# Patient Record
Sex: FEMALE | NOT HISPANIC OR LATINO | URBAN - METROPOLITAN AREA
[De-identification: names, ages, dates, MRNs, and addresses within clinical notes are randomized per-mention and may not be internally consistent; named-entity substitution may affect disease eponyms.]

---

## 2017-01-27 ENCOUNTER — OFFICE VISIT (OUTPATIENT)
Dept: FAMILY MEDICINE | Facility: CLINIC | Age: 47
End: 2017-01-27
Payer: COMMERCIAL

## 2017-01-27 VITALS
TEMPERATURE: 97.8 F | DIASTOLIC BLOOD PRESSURE: 78 MMHG | HEIGHT: 64 IN | WEIGHT: 130 LBS | SYSTOLIC BLOOD PRESSURE: 112 MMHG | BODY MASS INDEX: 22.2 KG/M2 | HEART RATE: 68 BPM

## 2017-01-27 DIAGNOSIS — E03.2 HYPOTHYROIDISM DUE TO MEDICATION: ICD-10-CM

## 2017-01-27 DIAGNOSIS — Z13.1 SCREENING FOR DIABETES MELLITUS: ICD-10-CM

## 2017-01-27 DIAGNOSIS — Z00.00 ROUTINE GENERAL MEDICAL EXAMINATION AT A HEALTH CARE FACILITY: Primary | ICD-10-CM

## 2017-01-27 DIAGNOSIS — Z11.3 SCREEN FOR STD (SEXUALLY TRANSMITTED DISEASE): ICD-10-CM

## 2017-01-27 DIAGNOSIS — Z13.6 SCREENING FOR CARDIOVASCULAR CONDITION: ICD-10-CM

## 2017-01-27 DIAGNOSIS — R42 LIGHTHEADEDNESS: ICD-10-CM

## 2017-01-27 DIAGNOSIS — E05.00 TOXIC DIFFUSE GOITER: ICD-10-CM

## 2017-01-27 DIAGNOSIS — Z12.4 SCREENING FOR MALIGNANT NEOPLASM OF CERVIX: ICD-10-CM

## 2017-01-27 DIAGNOSIS — L91.8 SKIN TAG: ICD-10-CM

## 2017-01-27 LAB
ERYTHROCYTE [DISTWIDTH] IN BLOOD BY AUTOMATED COUNT: 12.9 % (ref 10–15)
HCT VFR BLD AUTO: 35.3 % (ref 35–47)
HGB BLD-MCNC: 11.5 G/DL (ref 11.7–15.7)
MCH RBC QN AUTO: 28.5 PG (ref 26.5–33)
MCHC RBC AUTO-ENTMCNC: 32.6 G/DL (ref 31.5–36.5)
MCV RBC AUTO: 88 FL (ref 78–100)
PLATELET # BLD AUTO: 339 10E9/L (ref 150–450)
RBC # BLD AUTO: 4.03 10E12/L (ref 3.8–5.2)
WBC # BLD AUTO: 6.6 10E9/L (ref 4–11)

## 2017-01-27 PROCEDURE — 36415 COLL VENOUS BLD VENIPUNCTURE: CPT | Performed by: PHYSICIAN ASSISTANT

## 2017-01-27 PROCEDURE — 87624 HPV HI-RISK TYP POOLED RSLT: CPT | Performed by: PHYSICIAN ASSISTANT

## 2017-01-27 PROCEDURE — 87591 N.GONORRHOEAE DNA AMP PROB: CPT | Performed by: PHYSICIAN ASSISTANT

## 2017-01-27 PROCEDURE — 84443 ASSAY THYROID STIM HORMONE: CPT | Performed by: PHYSICIAN ASSISTANT

## 2017-01-27 PROCEDURE — 85027 COMPLETE CBC AUTOMATED: CPT | Performed by: PHYSICIAN ASSISTANT

## 2017-01-27 PROCEDURE — 82947 ASSAY GLUCOSE BLOOD QUANT: CPT | Performed by: PHYSICIAN ASSISTANT

## 2017-01-27 PROCEDURE — 80061 LIPID PANEL: CPT | Performed by: PHYSICIAN ASSISTANT

## 2017-01-27 PROCEDURE — 99396 PREV VISIT EST AGE 40-64: CPT | Performed by: PHYSICIAN ASSISTANT

## 2017-01-27 PROCEDURE — G0145 SCR C/V CYTO,THINLAYER,RESCR: HCPCS | Performed by: PHYSICIAN ASSISTANT

## 2017-01-27 PROCEDURE — 87491 CHLMYD TRACH DNA AMP PROBE: CPT | Performed by: PHYSICIAN ASSISTANT

## 2017-01-27 NOTE — PROGRESS NOTES
SUBJECTIVE:     CC: Valentin Butts is an 46 year old woman who presents for preventive health visit.     Healthy Habits:    Do you get at least three servings of calcium containing foods daily (dairy, green leafy vegetables, etc.)? no, taking calcium and/or vitamin D supplement: yes - vitamin D     Amount of exercise or daily activities, outside of work: 7 day(s) per week    Problems taking medications regularly No    Medication side effects: No    Have you had an eye exam in the past two years? yes    Do you see a dentist twice per year? no    Do you have sleep apnea, excessive snoring or daytime drowsiness? no      - C/o spell of lightheadedness, neck pain, nausea which resolved after 1-2 hours. Took antacid, which helped with nausea. May have been dehydrated. No aggravating factors.  - Has extra skin in groin area, would like examined.    Today's PHQ-2 Score:   PHQ-2 ( 1999 Pfizer) 7/1/2016 1/26/2016   Q1: Little interest or pleasure in doing things 0 0   Q2: Feeling down, depressed or hopeless 1 1   PHQ-2 Score 1 1       Abuse: Current or Past(Physical, Sexual or Emotional)- No  Do you feel safe in your environment - Yes    Social History   Substance Use Topics     Smoking status: Never Smoker      Smokeless tobacco: Never Used     Alcohol Use: Yes      Comment: occasional at functions     The patient does not drink >3 drinks per day nor >7 drinks per week.    Recent Labs   Lab Test  01/26/16   0946  10/02/12   0912   CHOL  211*  210*   HDL  61  56   LDL  135*  135*   TRIG  74  96   CHOLHDLRATIO   --   3.8   NHDL  150*   --        Reviewed orders with patient.  Reviewed health maintenance and updated orders accordingly - Yes    Mammo Decision Support:  Patient over age 50, mutual decision to screen reflected in health maintenance.    Pertinent mammograms are reviewed under the imaging tab.  History of abnormal Pap smear:   NO - age 30-65 PAP every 5 years with negative HPV co-testing recommended  Last 3 Pap  "Results:   PAP (no units)   Date Value   10/02/2012 NIL     All Histories reviewed and updated in Epic.      ROS:  C: NEGATIVE for fever, chills, change in weight  I: NEGATIVE for worrisome rashes, moles or lesions  E: NEGATIVE for vision changes or irritation  ENT: NEGATIVE for ear, mouth and throat problems  R: NEGATIVE for significant cough or SOB  B: NEGATIVE for masses, tenderness or discharge  CV: NEGATIVE for chest pain, palpitations or peripheral edema  GI: as above  : NEGATIVE for unusual urinary or vaginal symptoms. Periods are regular.  M: NEGATIVE for significant arthralgias or myalgia  N: as above  P: NEGATIVE for changes in mood or affect    Problem list, Medication list, Allergies, and Medical/Social/Surgical histories reviewed in [facility] and updated as appropriate.  OBJECTIVE:     /78 mmHg  Pulse 68  Temp(Src) 97.8  F (36.6  C) (Tympanic)  Ht 5' 3.5\" (1.613 m)  Wt 130 lb (58.968 kg)  BMI 22.66 kg/m2  Breastfeeding? No  EXAM:  GENERAL: healthy, alert and no distress  EYES: mild exophthalmos (R > L), PERRL and conjunctivae and sclerae normal  HENT: ear canals and TM's normal, nose and mouth without ulcers or lesions  NECK: no adenopathy, no asymmetry, masses, or scars and thyroid normal to palpation  RESP: lungs clear to auscultation - no rales, rhonchi or wheezes  BREAST: normal without masses, tenderness or nipple discharge and no palpable axillary masses or adenopathy  CV: regular rate and rhythm, normal S1 S2, no S3 or S4, no murmur, click or rub, no peripheral edema and peripheral pulses strong  ABDOMEN: soft, nontender, no hepatosplenomegaly, no masses and bowel sounds normal   (female): normal female external genitalia, normal urethral meatus, vaginal mucosa pink, moist, well rugated, and normal cervix/adnexa/uterus without masses or discharge. Mod-large sized skin tag on left mid-labia majora.  MS: no gross musculoskeletal defects noted, no edema  SKIN: no suspicious lesions or " "rashes  NEURO: Normal strength and tone, mentation intact and speech normal  PSYCH: mentation appears normal, affect normal/bright    ASSESSMENT/PLAN:         ICD-10-CM    1. Routine general medical examination at a health care facility Z00.00    2. Lightheadedness R42 CBC with platelets   3. 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 Skin tag L91.8 OB/GYN REFERRAL   5. 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 Screening for diabetes mellitus Z13.1 Glucose   7. Screening for cardiovascular condition Z13.6 Lipid panel reflex to direct LDL   8. Screening for malignant neoplasm of cervix Z12.4 Pap imaged thin layer screen with HPV - recommended age 30 - 65 years (select HPV order below)     HPV High Risk Types DNA Cervical   9. Screen for STD (sexually transmitted disease) Z11.3 Chlamydia trachomatis PCR     Neisseria gonorrhoeae PCR     - Lightheadedness likely related to dehydration. Will check TSH, CBC to r/o underlying metabolic cause.  - Given large size and wide base, referred to gyn for labial skin tag removal.    COUNSELING:   Reviewed preventive health counseling, as reflected in patient instructions     reports that she has never smoked. She has never used smokeless tobacco.    Estimated body mass index is 22.66 kg/(m^2) as calculated from the following:    Height as of this encounter: 5' 3.5\" (1.613 m).    Weight as of this encounter: 130 lb (58.968 kg).       Counseling Resources:  ATP IV Guidelines  Pooled Cohorts Equation Calculator  Breast Cancer Risk Calculator  FRAX Risk Assessment  ICSI Preventive Guidelines  Dietary Guidelines for Americans, 2010  USDA's MyPlate  ASA Prophylaxis  Lung CA Screening    Federica Lacy PA-C  [facility]  "

## 2017-01-27 NOTE — Clinical Note
[place] [address] [facility]  [address]  [facility] [address] [phone]      February 2, 2017    Valentin Butts  [address]  [place] [address]    Dear Valentin,  We are happy to inform you that your PAP smear result from 01/27/17 is normal.  We are now able to do a follow up test on PAP smears. The DNA test is for HPV (Human Papilloma Virus). Cervical cancer is closely linked with certain types of HPV. Your result showed no evidence of high risk HPV.  Therefore we recommend you return in 5 years for your next pap smear and HPV test.  You will still need to return to the clinic every year for an annual exam and other preventive tests.  Please contact the clinic with any questions.  Sincerely,  Federica Lacy PA-C/yudy

## 2017-01-27 NOTE — MR AVS SNAPSHOT
After Visit Summary   1/27/2017    Valentin Butts    MRN: [id number]           Patient Information     Date Of Birth          1970        Visit Information        Provider Department      1/27/2017 8:10 AM Federica Lacy PA-C [facility]        Today's Diagnoses     Routine general medical examination at a health care facility    -  1     Lightheadedness         Hypothyroidism due to medication         Skin tag         Toxic diffuse goiter         Screening for diabetes mellitus         Screening for cardiovascular condition         Screening for malignant neoplasm of cervix         Screen for STD (sexually transmitted disease)           Care Instructions      Preventive Health Recommendations  Female Ages 40 to 49    Yearly exam:     See your health care provider every year in order to  1. Review health changes.   2. Discuss preventive care.    3. Review your medicines if your doctor prescribed any.      Get a Pap test every three years (unless you have an abnormal result and your provider advises testing more often).      If you get Pap tests with HPV test, you only need to test every 5 years, unless you have an abnormal result. You do not need a Pap test if your uterus was removed (hysterectomy) and you have not had cancer.      You should be tested each year for STDs (sexually transmitted diseases), if you're at risk.       Ask your doctor if you should have a mammogram.      Have a colonoscopy (test for colon cancer) if someone in your family has had colon cancer or polyps before age 50.       Have a cholesterol test every 5 years.       Have a diabetes test (fasting glucose) after age 45. If you are at risk for diabetes, you should have this test every 3 years.    Shots: Get a flu shot each year. Get a tetanus shot every 10 years.     Nutrition:     Eat at least 5 servings of fruits and vegetables each day.    Eat whole-grain bread, whole-wheat pasta and  brown rice instead of white grains and rice.    Talk to your provider about Calcium and Vitamin D.     Lifestyle    Exercise at least 150 minutes a week (an average of 30 minutes a day, 5 days a week). This will help you control your weight and prevent disease.    Limit alcohol to one drink per day.    No smoking.     Wear sunscreen to prevent skin cancer.    See your dentist every six months for an exam and cleaning.        Follow-ups after your visit        Additional Services     OB/GYN REFERRAL       Your provider has referred you to:  [facility]: [facility] [phone]   [email or web address]    Please be aware that coverage of these services is subject to the terms and limitations of your health insurance plan.  Call member services at your health plan with any benefit or coverage questions.      Please bring the following with you to your appointment:    (1) Any X-Rays, CTs or MRIs which have been performed.  Contact the facility where they were done to arrange for  prior to your scheduled appointment.   (2) List of current medications   (3) This referral request   (4) Any documents/labs given to you for this referral                  Who to contact     If you have questions or need follow up information about today's clinic visit or your schedule please contact [facility] directly at [phone].  Normal or non-critical lab and imaging results will be communicated to you by MyChart, letter or phone within 4 business days after the clinic has received the results. If you do not hear from us within 7 days, please contact the clinic through MyChart or phone. If you have a critical or abnormal lab result, we will notify you by phone as soon as possible.  Submit refill requests through [employer] or call your pharmacy and they will forward the refill request to us. Please allow 3 business days for  "your refill to be completed.          Additional Information About Your Visit        [employer] Information     [employer] gives you secure access to your electronic health record. If you see a primary care provider, you can also send messages to your care team and make appointments. If you have questions, please call your primary care clinic.  If you do not have a primary care provider, please call [phone] and they will assist you.        Care EveryWhere ID     This is your Care EveryWhere ID. This could be used by other organizations to access your [place] medical records  [id number]        Your Vitals Were     Pulse Temperature Height BMI (Body Mass Index) Breastfeeding?       68 97.8  F (36.6  C) (Tympanic) 5' 3.5\" (1.613 m) 22.66 kg/m2 No        Blood Pressure from Last 3 Encounters:   01/27/17 112/78   07/01/16 115/87   01/26/16 110/70    Weight from Last 3 Encounters:   01/27/17 130 lb (58.968 kg)   07/01/16 122 lb 12.8 oz (55.702 kg)   01/26/16 125 lb (56.7 kg)              We Performed the Following     CBC with platelets     Chlamydia trachomatis PCR     Glucose     HPV High Risk Types DNA Cervical     Lipid panel reflex to direct LDL     Neisseria gonorrhoeae PCR     OB/GYN REFERRAL     Pap imaged thin layer screen with HPV - recommended age 30 - 65 years (select HPV order below)     TSH with free T4 reflex          Today's Medication Changes          These changes are accurate as of: 1/27/17  9:03 AM.  If you have any questions, ask your nurse or doctor.               Stop taking these medicines if you haven't already. Please contact your care team if you have questions.     MULTIVITAMIN & MINERAL PO   Stopped by:  Federica Lacy PA-C           naproxen 500 MG tablet   Commonly known as:  NAPROSYN   Stopped by:  Federica Lacy PA-C           SYSTANE OP   Stopped by:  Federica Lacy PA-C                    Primary Care Provider Office Phone # Fax #    Federica Lacy PA-C [phone] " [phone]       [facility] [address]  [facility] [address]        Thank you!     Thank you for choosing [facility]  for your care. Our goal is always to provide you with excellent care. Hearing back from our patients is one way we can continue to improve our services. Please take a few minutes to complete the written survey that you may receive in the mail after your visit with us. Thank you!             Your Updated Medication List - Protect others around you: Learn how to safely use, store and throw away your medicines at www.disposemymeds.org.          This list is accurate as of: 1/27/17  9:03 AM.  Always use your most recent med list.                   Brand Name Dispense Instructions for use    SYNTHROID 88 MCG tablet   Generic drug:  levothyroxine

## 2017-01-28 LAB
CHOLEST SERPL-MCNC: 215 MG/DL
GLUCOSE SERPL-MCNC: 88 MG/DL (ref 70–99)
HDLC SERPL-MCNC: 61 MG/DL
LDLC SERPL CALC-MCNC: 132 MG/DL
NONHDLC SERPL-MCNC: 154 MG/DL
TRIGL SERPL-MCNC: 112 MG/DL
TSH SERPL DL<=0.005 MIU/L-ACNC: 2.01 MU/L (ref 0.4–4)

## 2017-01-29 LAB
C TRACH DNA SPEC QL NAA+PROBE: NORMAL
N GONORRHOEA DNA SPEC QL NAA+PROBE: NORMAL
SPECIMEN SOURCE: NORMAL
SPECIMEN SOURCE: NORMAL

## 2017-01-31 LAB
COPATH REPORT: NORMAL
PAP: NORMAL

## 2017-02-01 LAB
FINAL DIAGNOSIS: NORMAL
HPV HR 12 DNA CVX QL NAA+PROBE: NEGATIVE
HPV16 DNA SPEC QL NAA+PROBE: NEGATIVE
HPV18 DNA SPEC QL NAA+PROBE: NEGATIVE
SPECIMEN DESCRIPTION: NORMAL

## 2017-02-04 ENCOUNTER — TELEPHONE (OUTPATIENT)
Dept: FAMILY MEDICINE | Facility: CLINIC | Age: 47
End: 2017-02-04

## 2017-02-06 NOTE — TELEPHONE ENCOUNTER
Called patient with lab results. Mailed copy for pt's review. She was also given information to access [employer]. Emailed her information about controlling cholesterol.

## 2017-03-21 ENCOUNTER — OFFICE VISIT (OUTPATIENT)
Dept: OBGYN | Facility: CLINIC | Age: 47
End: 2017-03-21
Attending: OBSTETRICS & GYNECOLOGY
Payer: COMMERCIAL

## 2017-03-21 DIAGNOSIS — N90.89 VULVAR LESION: Primary | ICD-10-CM

## 2017-03-21 PROCEDURE — 40000269 ZZH STATISTIC NO CHARGE FACILITY FEE

## 2017-03-21 PROCEDURE — 11200 RMVL SKIN TAGS UP TO&INC 15: CPT | Mod: ZF | Performed by: OBSTETRICS & GYNECOLOGY

## 2017-03-21 PROCEDURE — 99213 OFFICE O/P EST LOW 20 MIN: CPT | Mod: ZF

## 2017-03-21 NOTE — LETTER
3/21/2017       RE: Valentin Butts  [address]  [facility] [address]     Dear Colleague,    Thank you for referring your patient, Valentin Butts, to the [facility] at [facility]. Please see a copy of my visit note below.    [facility]  Gynecology Visit    Reason for Consult: Labial skin tag  Consulting Provider: Federica Lacy PA-C    HPI:    Valentin Butts is a 46 year old who presents to clinic today for removal of labial skin tag. She states that this has been present for the last 3 months. She shaved her labia and noticed that she nicked the skin and the skin tag developed shortly thereafter. It is not painful but does become uncomfortable when it rubs on her underwear or pants. She also finds it embarrassing.     GYN History  - LMP: currently menstruating (first day 3/20/17)  - Pap Smears: no history of abnormal paps, NILM HPV neg on 1/27/17  - No concern for STIs: last negative GC & Chlam performed 1/27/17      ROS: 10-Point ROS negative except as noted in HPI    Physical Exam  Gen: Well-appearing, NAD    Pelvic:  Normal appearing external female genitalia. Normal hair distribution. Skin tag measuring approximately 7x7 mm noted on right labia majora.     Procedure Note:  2 cc of 1% lidocaine without epinephrine was injected into the skin surrounding the skin tag. The area was then prepped with betadine. The skin was grasped with Adson tissue forceps, placed and an 11 blade scalpel was used to remove the skin tag from its base. Pressure was applied and silver nitrate was used to cauterize the area. Hemostasis was noted following the procedure. EBL 2 cc. Dr. Sadia Flores was present for the entirety of the procedure.     Assessment/Plan:  Valentin Butts is a 46 year old female here for removal of labial skin tag.    - Procedure performed without complication as noted above.  - Vulvar hygiene reviewed  - Instructed patient to look for signs  of infection  - May take tylenol or ibuprofen PRN for discomfort    Return to clinic as needed for care    Staffed with Dr. Sadia Villalobos MD  OBGYN PGY-2  [phone]  3/21/2017, 2:57 PM    The Patient was seen in [facility] by Dr. Villalobos.   I reviewed the history & exam. Assessment and plan were jointly made.  I was present for the entire procedure as noted above.  Sadia Flores MD, MPH        Again, thank you for allowing me to participate in the care of your patient.      Sincerely,    Shawna Villalobos MD

## 2017-03-21 NOTE — MR AVS SNAPSHOT
After Visit Summary   3/21/2017    Valentin Butts    MRN: [id number]           Patient Information     Date Of Birth          1970        Visit Information        Provider Department      3/21/2017 3:00 PM Shawna Villalobos MD [facility]        Care Instructions    Perform normal vulvar hygiene as per previously. Please keep a close eye for signs of infection (fevers, chills, redness or purulent drainage form procedure site).         Follow-ups after your visit        Follow-up notes from your care team     Return if symptoms worsen or fail to improve.      Who to contact     Please call your clinic at [phone] to:    Ask questions about your health    Make or cancel appointments    Discuss your medicines    Learn about your test results    Speak to your doctor   If you have compliments or concerns about an experience at your clinic, or if you wish to file a complaint, please contact [facility] Patient Relations at [phone] or email us at [email or web address]         Additional Information About Your Visit        MyChart Information     [employer] gives you secure access to your electronic health record. If you see a primary care provider, you can also send messages to your care team and make appointments. If you have questions, please call your primary care clinic.  If you do not have a primary care provider, please call [phone] and they will assist you.      [employer] is an electronic gateway that provides easy, online access to your medical records. With [employer], you can request a clinic appointment, read your test results, renew a prescription or communicate with your care team.     To access your existing account, please contact your [facility] or call [phone] for assistance.        Care EveryWhere ID     This is your Care EveryWhere ID. This could be used by other organizations to  access your [place] medical records  [id number]         Blood Pressure from Last 3 Encounters:   01/27/17 112/78   07/01/16 115/87   01/26/16 110/70    Weight from Last 3 Encounters:   01/27/17 59 kg (130 lb)   07/01/16 55.7 kg (122 lb 12.8 oz)   01/26/16 56.7 kg (125 lb)              Today, you had the following     No orders found for display       Primary Care Provider Office Phone # Fax #    Federica Lacy PA-C [phone]       [facility] [address]  [facility] [address]        Thank you!     Thank you for choosing [facility]  for your care. Our goal is always to provide you with excellent care. Hearing back from our patients is one way we can continue to improve our services. Please take a few minutes to complete the written survey that you may receive in the mail after your visit with us. Thank you!             Your Updated Medication List - Protect others around you: Learn how to safely use, store and throw away your medicines at www.disposemymeds.org.          This list is accurate as of: 3/21/17  3:35 PM.  Always use your most recent med list.                   Brand Name Dispense Instructions for use    SYNTHROID 88 MCG tablet   Generic drug:  levothyroxine

## 2017-03-21 NOTE — NURSING NOTE
Chief Complaint   Patient presents with     Establish Care     Skin tag removal   Angie Norton LPN

## 2017-03-21 NOTE — PATIENT INSTRUCTIONS
Perform normal vulvar hygiene as per previously. Please keep a close eye for signs of infection (fevers, chills, redness or purulent drainage form procedure site).

## 2017-03-21 NOTE — PROGRESS NOTES
[facility]  Gynecology Visit    Reason for Consult: Labial skin tag  Consulting Provider: Federica Lacy PA-C    HPI:    Valentin Butts is a 46 year old who presents to clinic today for removal of labial skin tag. She states that this has been present for the last 3 months. She shaved her labia and noticed that she nicked the skin and the skin tag developed shortly thereafter. It is not painful but does become uncomfortable when it rubs on her underwear or pants. She also finds it embarrassing.     GYN History  - LMP: currently menstruating (first day 3/20/17)  - Pap Smears: no history of abnormal paps, NILM HPV neg on 1/27/17  - No concern for STIs: last negative GC & Chlam performed 1/27/17      ROS: 10-Point ROS negative except as noted in HPI    Physical Exam  Gen: Well-appearing, NAD    Pelvic:  Normal appearing external female genitalia. Normal hair distribution. Skin tag measuring approximately 7x7 mm noted on right labia majora.     Procedure Note:  2 cc of 1% lidocaine without epinephrine was injected into the skin surrounding the skin tag. The area was then prepped with betadine. The skin was grasped with Adson tissue forceps, placed and an 11 blade scalpel was used to remove the skin tag from its base. Pressure was applied and silver nitrate was used to cauterize the area. Hemostasis was noted following the procedure. EBL 2 cc. Dr. Sadia Flores was present for the entirety of the procedure.     Assessment/Plan:  Valentin Butts is a 46 year old female here for removal of labial skin tag.    - Procedure performed without complication as noted above.  - Vulvar hygiene reviewed  - Instructed patient to look for signs of infection  - May take tylenol or ibuprofen PRN for discomfort    Return to clinic as needed for care    Staffed with Dr. Sadia Villalobos MD  OBGYN PGY-2  [phone]  3/21/2017, 2:57 PM    The Patient was seen in [facility] by Dr. Villalobos.   I  reviewed the history & exam. Assessment and plan were jointly made.  I was present for the entire procedure as noted above.  Sadia Flores MD, MPH

## 2017-12-14 ENCOUNTER — TELEPHONE (OUTPATIENT)
Dept: FAMILY MEDICINE | Facility: CLINIC | Age: 47
End: 2017-12-14

## 2017-12-14 DIAGNOSIS — M79.671 HEEL PAIN, BILATERAL: Primary | ICD-10-CM

## 2017-12-14 DIAGNOSIS — M79.672 HEEL PAIN, BILATERAL: Primary | ICD-10-CM

## 2017-12-14 NOTE — TELEPHONE ENCOUNTER
Patient called reporting bilateral heal pain for a month. Report she has tried changing shoes,doing heal exercises with roller, applying ointments for pain but no improvement of symptoms. Asked if and X-ray appropriate. Denies any injury. Advice she use shoes with cushion. Please advice if referral for podiatry appropriate or OV at clinic preferred.

## 2017-12-14 NOTE — TELEPHONE ENCOUNTER
Reason for Call:  Other     Detailed comments: heel pain, what to do or where should she go.    Phone Number Patient can be reached at: Home number on file [phone] (home)    Best Time: any    Can we leave a detailed message on this number? YES    Call taken on 12/14/2017 at 11:31 AM by AUDIE WALLER

## 2017-12-15 NOTE — TELEPHONE ENCOUNTER
Ok to be seen by podiatry due to refractory heel pain, referral placed. Patient should receive phone call from their  within 2 business days.

## 2018-03-09 ENCOUNTER — OFFICE VISIT (OUTPATIENT)
Dept: FAMILY MEDICINE | Facility: CLINIC | Age: 48
End: 2018-03-09
Payer: COMMERCIAL

## 2018-03-09 VITALS
BODY MASS INDEX: 23.04 KG/M2 | RESPIRATION RATE: 16 BRPM | HEIGHT: 63 IN | SYSTOLIC BLOOD PRESSURE: 112 MMHG | WEIGHT: 130 LBS | DIASTOLIC BLOOD PRESSURE: 80 MMHG | OXYGEN SATURATION: 100 % | HEART RATE: 75 BPM

## 2018-03-09 DIAGNOSIS — Z13.6 SCREENING FOR CARDIOVASCULAR CONDITION: ICD-10-CM

## 2018-03-09 DIAGNOSIS — E05.00 TOXIC DIFFUSE GOITER: ICD-10-CM

## 2018-03-09 DIAGNOSIS — Z00.00 ROUTINE GENERAL MEDICAL EXAMINATION AT A HEALTH CARE FACILITY: Primary | ICD-10-CM

## 2018-03-09 DIAGNOSIS — Z13.1 SCREENING FOR DIABETES MELLITUS: ICD-10-CM

## 2018-03-09 DIAGNOSIS — M25.512 ACUTE PAIN OF LEFT SHOULDER: ICD-10-CM

## 2018-03-09 DIAGNOSIS — E03.2 HYPOTHYROIDISM DUE TO MEDICATION: ICD-10-CM

## 2018-03-09 DIAGNOSIS — K21.9 GASTROESOPHAGEAL REFLUX DISEASE WITHOUT ESOPHAGITIS: ICD-10-CM

## 2018-03-09 LAB
CHOLEST SERPL-MCNC: 213 MG/DL
GLUCOSE SERPL-MCNC: 81 MG/DL (ref 70–99)
HDLC SERPL-MCNC: 57 MG/DL
LDLC SERPL CALC-MCNC: 140 MG/DL
NONHDLC SERPL-MCNC: 156 MG/DL
TRIGL SERPL-MCNC: 80 MG/DL
TSH SERPL DL<=0.005 MIU/L-ACNC: 1.03 MU/L (ref 0.4–4)

## 2018-03-09 PROCEDURE — 99213 OFFICE O/P EST LOW 20 MIN: CPT | Mod: 25 | Performed by: PHYSICIAN ASSISTANT

## 2018-03-09 PROCEDURE — 36415 COLL VENOUS BLD VENIPUNCTURE: CPT | Performed by: PHYSICIAN ASSISTANT

## 2018-03-09 PROCEDURE — 84443 ASSAY THYROID STIM HORMONE: CPT | Performed by: PHYSICIAN ASSISTANT

## 2018-03-09 PROCEDURE — 82947 ASSAY GLUCOSE BLOOD QUANT: CPT | Performed by: PHYSICIAN ASSISTANT

## 2018-03-09 PROCEDURE — 99396 PREV VISIT EST AGE 40-64: CPT | Performed by: PHYSICIAN ASSISTANT

## 2018-03-09 PROCEDURE — 80061 LIPID PANEL: CPT | Performed by: PHYSICIAN ASSISTANT

## 2018-03-09 NOTE — PROGRESS NOTES
"Inocencio Hanson,    I just wanted to let you know that your lab results have been reviewed and are attached.    - Your total cholesterol is HIGH (>200) but this is because you have high \"good\" (HDL) cholesterol so it is not a cause for concern., - LDL (bad cholesterol) is normal (<160), - HDL (good cholesterol) is normal, - Triglycerides are normal (<150)  - Your glucose (screening for diabetes) was normal.  - Your TSH was normal, indicating that you are on the correct dose of thyroid medication.    Please let me know if you have any questions.    Sincerely,    Federica Lacy PA-C    [facility]  [address] [place] [address]  [phone] (p)"

## 2018-03-09 NOTE — NURSING NOTE
"Chief Complaint   Patient presents with     Physical       Initial /80  Pulse 75  Resp 16  Ht 5' 3.25\" (1.607 m)  Wt 130 lb (59 kg)  SpO2 100%  BMI 22.85 kg/m2 Estimated body mass index is 22.85 kg/(m^2) as calculated from the following:    Height as of this encounter: 5' 3.25\" (1.607 m).    Weight as of this encounter: 130 lb (59 kg).  Medication Reconciliation: florencio Plascencia CMA      "

## 2018-03-09 NOTE — PROGRESS NOTES
SUBJECTIVE:   CC: Valentin Butts is an 47 year old woman who presents for preventive health visit. Patient is fasting.    Physical   Annual:     Getting at least 3 servings of Calcium per day::  NO    Bi-annual eye exam::  NO    Dental care twice a year::  NO    Sleep apnea or symptoms of sleep apnea::  None    Diet::  Other    Frequency of exercise::  1 day/week    Duration of exercise::  15-30 minutes    Taking medications regularly::  Yes    Medication side effects::  None    Additional concerns today::  No            - Patient c/o right-sided neck over the past week; waxes and wanes. Sometimes worse with deep breaths. Pain is described as a dull ache; wraps around top of Lt shoulder from front to back.  - C/o early satiety and excess belching, bloating after eating. Doesn't seem to matter what she eats. Feels like food and liquids sit in her stomach at times. Can take >1 hr to resolve.    Today's PHQ-2 Score:   PHQ-2 ( 1999 [employer]) 3/9/2018   Q1: Little interest or pleasure in doing things 0   Q2: Feeling down, depressed or hopeless 0   PHQ-2 Score 0   Q1: Little interest or pleasure in doing things Not at all   Q2: Feeling down, depressed or hopeless Not at all   PHQ-2 Score 0       Abuse: Current or Past(Physical, Sexual or Emotional)- No  Do you feel safe in your environment - Yes    Social History   Substance Use Topics     Smoking status: Never Smoker     Smokeless tobacco: Never Used     Alcohol use Yes      Comment: occasional at functions     No flowsheet data found.    Reviewed orders with patient.  Reviewed health maintenance and updated orders accordingly - Yes    Patient under age 50, mutual decision reflected in health maintenance.      Pertinent mammograms are reviewed under the imaging tab.  History of abnormal Pap smear: NO - age 30-65 PAP every 5 years with negative HPV co-testing recommended    Reviewed and updated as needed this visit by clinical staff  Tobacco  Allergies  Meds  Problems  " Med Hx  Surg Hx  Fam Hx  Soc Hx          Reviewed and updated as needed this visit by Provider  Allergies  Meds  Problems            Review of Systems  C: NEGATIVE for fever, chills, change in weight  I: NEGATIVE for worrisome rashes, moles or lesions  E: NEGATIVE for vision changes or irritation  ENT: NEGATIVE for ear, mouth and throat problems  R: NEGATIVE for significant cough or SOB  B: NEGATIVE for masses, tenderness or discharge  CV: NEGATIVE for chest pain, palpitations or peripheral edema  GI: NEGATIVE for nausea, abdominal pain, heartburn, or change in bowel habits  : NEGATIVE for unusual urinary or vaginal symptoms. Periods are regular.  M: as above  N: NEGATIVE for weakness, dizziness or paresthesias  P: NEGATIVE for changes in mood or affect     OBJECTIVE:   /80  Pulse 75  Resp 16  Ht 5' 3.25\" (1.607 m)  Wt 130 lb (59 kg)  SpO2 100%  BMI 22.85 kg/m2  Physical Exam  GENERAL: healthy, alert and no distress  EYES: Eyes grossly normal to inspection, PERRL and conjunctivae and sclerae normal  HENT: ear canals and TM's normal, nose and mouth without ulcers or lesions  NECK: no adenopathy, no asymmetry, masses, or scars and thyroid normal to palpation  RESP: lungs clear to auscultation - no rales, rhonchi or wheezes  BREAST: normal without masses, tenderness or nipple discharge and no palpable axillary masses or adenopathy  CV: regular rate and rhythm, normal S1 S2, no S3 or S4, no murmur, click or rub, no peripheral edema and peripheral pulses strong  ABDOMEN: soft, nontender, no hepatosplenomegaly, no masses and bowel sounds normal  MS: no gross musculoskeletal defects noted, no edema. Mild TTP along Lt supraspinatus muscle and lateral Lt trapezius w/o appreciable hypertonicity  SKIN: no suspicious lesions or rashes  NEURO: Normal strength and tone, mentation intact and speech normal  PSYCH: mentation appears normal, affect normal/bright    ASSESSMENT/PLAN:       ICD-10-CM    1. Routine " "general medical examination at a health care facility Z00.00    2. Acute pain of left shoulder M25.512    3. Gastroesophageal reflux disease without esophagitis K21.9 XR Chest 2 Views   4. Hypothyroidism due to medication E03.2 TSH WITH FREE T4 REFLEX   5. 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 Screening for cardiovascular condition Z13.6 Lipid panel reflex to direct LDL Fasting   7. Screening for diabetes mellitus Z13.1 Glucose     - Pain in Lt shoulder consistent with MSK strain. May apply heat prn pain, followed by gentle ROM stretching.  - Reassured patient of benign abd exam; sx consistent with mild dyspepsia. Recommend smaller portions, more frequent meals. Avoid excess gas producers (carbonated beverages, beans/legumes, cruciferous vegetables). May use OTC products prn to relieve symptoms (Maalox, Pepto-Bismol, Tums, etc). May have small hiatal hernia, recommend CXR for further evaluation of this; XR tech not available at this time, future order placed. Patient to schedule XR at her earliest convenience.    COUNSELING:  Reviewed preventive health counseling, as reflected in patient instructions       reports that she has never smoked. She has never used smokeless tobacco.    Estimated body mass index is 22.85 kg/(m^2) as calculated from the following:    Height as of this encounter: 5' 3.25\" (1.607 m).    Weight as of this encounter: 130 lb (59 kg).       Counseling Resources:  ATP IV Guidelines  Pooled Cohorts Equation Calculator  Breast Cancer Risk Calculator  FRAX Risk Assessment  ICSI Preventive Guidelines  Dietary Guidelines for Americans, 2010  USDA's MyPlate  ASA Prophylaxis  Lung CA Screening    Federica Lacy PA-C  [facility]  "

## 2018-03-09 NOTE — MR AVS SNAPSHOT
After Visit Summary   3/9/2018    Valentin Butts    MRN: [id number]           Patient Information     Date Of Birth          1970        Visit Information        Provider Department      3/9/2018 7:30 AM Federica Lacy PA-C [facility]        Today's Diagnoses     Routine general medical examination at a health care facility    -  1    Acute pain of left shoulder        Gastroesophageal reflux disease without esophagitis        Hypothyroidism due to medication        Toxic diffuse goiter        Screening for cardiovascular condition        Screening for diabetes mellitus          Care Instructions      Preventive Health Recommendations  Female Ages 40 to 49    Yearly exam:     See your health care provider every year in order to  1. Review health changes.   2. Discuss preventive care.    3. Review your medicines if your doctor prescribed any.      Get a Pap test every three years (unless you have an abnormal result and your provider advises testing more often).      If you get Pap tests with HPV test, you only need to test every 5 years, unless you have an abnormal result. You do not need a Pap test if your uterus was removed (hysterectomy) and you have not had cancer.      You should be tested each year for STDs (sexually transmitted diseases), if you're at risk.       Ask your doctor if you should have a mammogram.      Have a colonoscopy (test for colon cancer) if someone in your family has had colon cancer or polyps before age 50.       Have a cholesterol test every 5 years.       Have a diabetes test (fasting glucose) after age 45. If you are at risk for diabetes, you should have this test every 3 years.    Shots: Get a flu shot each year. Get a tetanus shot every 10 years.     Nutrition:     Eat at least 5 servings of fruits and vegetables each day.    Eat whole-grain bread, whole-wheat pasta and brown rice instead of white grains and rice.    Talk to  "your provider about Calcium and Vitamin D.     Lifestyle    Exercise at least 150 minutes a week (an average of 30 minutes a day, 5 days a week). This will help you control your weight and prevent disease.    Limit alcohol to one drink per day.    No smoking.     Wear sunscreen to prevent skin cancer.    See your dentist every six months for an exam and cleaning.          Follow-ups after your visit        Follow-up notes from your care team     Return in about 1 year (around 3/9/2019) for Annual Exam.      Who to contact     If you have questions or need follow up information about today's clinic visit or your schedule please contact [facility] directly at [phone].  Normal or non-critical lab and imaging results will be communicated to you by [employer], letter or phone within 4 business days after the clinic has received the results. If you do not hear from us within 7 days, please contact the clinic through In Flowt or phone. If you have a critical or abnormal lab result, we will notify you by phone as soon as possible.  Submit refill requests through T[employer]egee or call your pharmacy and they will forward the refill request to us. Please allow 3 business days for your refill to be completed.          Additional Information About Your Visit        [email or web address] Information     [employer] gives you secure access to your electronic health record. If you see a primary care provider, you can also send messages to your care team and make appointments. If you have questions, please call your primary care clinic.  If you do not have a primary care provider, please call [phone] and they will assist you.        Care EveryWhere ID     This is your Care EveryWhere ID. This could be used by other organizations to access your [place] medical records  [id number]        Your Vitals Were     Pulse Respirations Height Pulse Oximetry BMI (Body Mass Index)       75 16 5' 3.25\" (1.607 m) 100% 22.85 " kg/m2        Blood Pressure from Last 3 Encounters:   03/09/18 112/80   01/27/17 112/78   07/01/16 115/87    Weight from Last 3 Encounters:   03/09/18 130 lb (59 kg)   01/27/17 130 lb (59 kg)   07/01/16 122 lb 12.8 oz (55.7 kg)              We Performed the Following     Glucose     Lipid panel reflex to direct LDL Fasting     TSH WITH FREE T4 REFLEX        Primary Care Provider Office Phone # Fax #    Federica Lacy PA-C [phone]       [address] [facility] [address]  [facility] [address]        Equal Access to Services     [facility] Center[facility] : Hadii aad ku hadasho Soomaali, waaxda luqadaha, qaybta kaalmada adeegyada, tracey greenwood . So [facility] [place] [phone].    ATENCIÓN: Si habla español, tiene a juarez disposición servicios gratuitos de asistencia lingüística. Llame al [phone].    We comply with applicable federal civil rights laws and [place] laws. We do not discriminate on the basis of race, color, national origin, age, disability, sex, sexual orientation, or gender identity.            Thank you!     Thank you for choosing [facility] [place]  for your care. Our goal is always to provide you with excellent care. Hearing back from our patients is one way we can continue to improve our services. Please take a few minutes to complete the written survey that you may receive in the mail after your visit with us. Thank you!             Your Updated Medication List - Protect others around you: Learn how to safely use, store and throw away your medicines at www.disposemymeds.org.          This list is accurate as of 3/9/18  7:55 AM.  Always use your most recent med list.                   Brand Name Dispense Instructions for use Diagnosis    SYNTHROID 88 MCG tablet   Generic drug:  levothyroxine

## 2018-03-19 ENCOUNTER — TELEPHONE (OUTPATIENT)
Dept: FAMILY MEDICINE | Facility: CLINIC | Age: 48
End: 2018-03-19

## 2018-03-19 NOTE — TELEPHONE ENCOUNTER
Patient was called- lab letter sent as MyChart to her; she requested an actual letter be sent out also.  Patient care team 3 to mail to patient

## 2018-11-21 ENCOUNTER — OFFICE VISIT (OUTPATIENT)
Dept: FAMILY MEDICINE | Facility: CLINIC | Age: 48
End: 2018-11-21
Payer: COMMERCIAL

## 2018-11-21 VITALS
DIASTOLIC BLOOD PRESSURE: 78 MMHG | BODY MASS INDEX: 23.81 KG/M2 | OXYGEN SATURATION: 100 % | HEART RATE: 78 BPM | TEMPERATURE: 97 F | WEIGHT: 135.5 LBS | SYSTOLIC BLOOD PRESSURE: 114 MMHG | RESPIRATION RATE: 16 BRPM

## 2018-11-21 DIAGNOSIS — E03.4 HYPOTHYROIDISM DUE TO ACQUIRED ATROPHY OF THYROID: ICD-10-CM

## 2018-11-21 DIAGNOSIS — R14.0 ABDOMINAL BLOATING: ICD-10-CM

## 2018-11-21 DIAGNOSIS — E78.00 HYPERCHOLESTEROLEMIA: ICD-10-CM

## 2018-11-21 DIAGNOSIS — R42 DIZZINESS: Primary | ICD-10-CM

## 2018-11-21 LAB
ALBUMIN SERPL-MCNC: 3.9 G/DL (ref 3.4–5)
ALP SERPL-CCNC: 89 U/L (ref 40–150)
ALT SERPL W P-5'-P-CCNC: 28 U/L (ref 0–50)
ANION GAP SERPL CALCULATED.3IONS-SCNC: 8 MMOL/L (ref 3–14)
AST SERPL W P-5'-P-CCNC: 26 U/L (ref 0–45)
BILIRUB SERPL-MCNC: 0.5 MG/DL (ref 0.2–1.3)
BUN SERPL-MCNC: 8 MG/DL (ref 7–30)
CALCIUM SERPL-MCNC: 9.4 MG/DL (ref 8.5–10.1)
CHLORIDE SERPL-SCNC: 102 MMOL/L (ref 94–109)
CHOLEST SERPL-MCNC: 178 MG/DL
CO2 SERPL-SCNC: 27 MMOL/L (ref 20–32)
CREAT SERPL-MCNC: 0.59 MG/DL (ref 0.52–1.04)
ERYTHROCYTE [DISTWIDTH] IN BLOOD BY AUTOMATED COUNT: 13.3 % (ref 10–15)
GFR SERPL CREATININE-BSD FRML MDRD: >90 ML/MIN/1.7M2
GLUCOSE SERPL-MCNC: 78 MG/DL (ref 70–99)
HCT VFR BLD AUTO: 39.1 % (ref 35–47)
HDLC SERPL-MCNC: 51 MG/DL
HGB BLD-MCNC: 12.7 G/DL (ref 11.7–15.7)
LDLC SERPL CALC-MCNC: 105 MG/DL
MCH RBC QN AUTO: 30.5 PG (ref 26.5–33)
MCHC RBC AUTO-ENTMCNC: 32.5 G/DL (ref 31.5–36.5)
MCV RBC AUTO: 94 FL (ref 78–100)
NONHDLC SERPL-MCNC: 127 MG/DL
PLATELET # BLD AUTO: 380 10E9/L (ref 150–450)
POTASSIUM SERPL-SCNC: 3.9 MMOL/L (ref 3.4–5.3)
PROT SERPL-MCNC: 7.7 G/DL (ref 6.8–8.8)
RBC # BLD AUTO: 4.17 10E12/L (ref 3.8–5.2)
SODIUM SERPL-SCNC: 137 MMOL/L (ref 133–144)
TRIGL SERPL-MCNC: 109 MG/DL
TSH SERPL DL<=0.005 MIU/L-ACNC: 1.74 MU/L (ref 0.4–4)
WBC # BLD AUTO: 6.8 10E9/L (ref 4–11)

## 2018-11-21 PROCEDURE — 84443 ASSAY THYROID STIM HORMONE: CPT | Performed by: FAMILY MEDICINE

## 2018-11-21 PROCEDURE — 99214 OFFICE O/P EST MOD 30 MIN: CPT | Performed by: FAMILY MEDICINE

## 2018-11-21 PROCEDURE — 36415 COLL VENOUS BLD VENIPUNCTURE: CPT | Performed by: FAMILY MEDICINE

## 2018-11-21 PROCEDURE — 80061 LIPID PANEL: CPT | Performed by: FAMILY MEDICINE

## 2018-11-21 PROCEDURE — 80053 COMPREHEN METABOLIC PANEL: CPT | Performed by: FAMILY MEDICINE

## 2018-11-21 PROCEDURE — 85027 COMPLETE CBC AUTOMATED: CPT | Performed by: FAMILY MEDICINE

## 2018-11-21 NOTE — MR AVS SNAPSHOT
After Visit Summary   11/21/2018    Valentin Butts    MRN: [id number]           Patient Information     Date Of Birth          1970        Visit Information        Provider Department      11/21/2018 7:45 AM Teo Conley MD [facility]        Today's Diagnoses     Dizziness    -  1    Abdominal bloating        Hypothyroidism due to acquired atrophy of thyroid          Care Instructions    Avoid changing positions quickly or turning head too quickly          Follow-ups after your visit        Follow-up notes from your care team     Return in about 2 weeks (around 12/5/2018).      Who to contact     If you have questions or need follow up information about today's clinic visit or your schedule please contact [facility] directly at [phone].  Normal or non-critical lab and imaging results will be communicated to you by INc[employer]eshart, letter or phone within 4 business days after the clinic has received the results. If you do not hear from us within 7 days, please contact the clinic through INcubeshart or phone. If you have a critical or abnormal lab result, we will notify you by phone as soon as possible.  Submit refill requests through [employer] or call your pharmacy and they will forward the refill request to us. Please allow 3 business days for your refill to be completed.          Additional Information About Your Visit        MyChart Information     [employer] gives you secure access to your electronic health record. If you see a primary care provider, you can also send messages to your care team and make appointments. If you have questions, please call your primary care clinic.  If you do not have a primary care provider, please call [phone] and they will assist you.        Care EveryWhere ID     This is your Care EveryWhere ID. This could be used by other organizations to access your [place] medical records  [id number]         Your Vitals Were     Pulse Temperature Respirations Last Period Pulse Oximetry BMI (Body Mass Index)    78 97  F (36.1  C) (Tympanic) 16 11/12/2018 (Within Weeks) 100% 23.81 kg/m2       Blood Pressure from Last 3 Encounters:   11/21/18 114/78   03/09/18 112/80   01/27/17 112/78    Weight from Last 3 Encounters:   11/21/18 135 lb 8 oz (61.5 kg)   03/09/18 130 lb (59 kg)   01/27/17 130 lb (59 kg)              We Performed the Following     CBC with platelets     Comprehensive metabolic panel (BMP + Alb, Alk Phos, ALT, AST, Total. Bili, TP)     TSH with free T4 reflex        Primary Care Provider Office Phone # Fax #    Federica Lacy PA-C [phone]       [address] [address]        Equal Access to Services     ROBINOSN SALINAS : Hadii aad ku hadasho Sokareem, waaxda luqadaha, qaybta kaalmada patrickyanorma, tracey greenwood . So [facility] [phone].    ATENCIÓN: Si habla español, tiene a juarez disposición servicios gratuitos de asistencia lingüística. Llame al [phone].    We comply with applicable federal civil rights laws and [place] laws. We do not discriminate on the basis of race, color, national origin, age, disability, sex, sexual orientation, or gender identity.            Thank you!     Thank you for choosing [facility]  for your care. Our goal is always to provide you with excellent care. Hearing back from our patients is one way we can continue to improve our services. Please take a few minutes to complete the written survey that you may receive in the mail after your visit with us. Thank you!             Your Updated Medication List - Protect others around you: Learn how to safely use, store and throw away your medicines at www.disposemymeds.org.          This list is accurate as of 11/21/18  8:08 AM.  Always use your most recent med list.                   Brand Name Dispense Instructions for use Diagnosis     SYNTHROID 88 MCG tablet   Generic drug:  levothyroxine

## 2018-11-21 NOTE — PROGRESS NOTES
SUBJECTIVE:   Valentni Butts is a 48 year old female who presents to clinic today for the following health issues:    Dizziness  Onset: 1 week    Description:   Do you feel faint:  no   Does it feel like the surroundings (bed, room) are moving: YES  Unsteady/off balance: no   Have you passed out or fallen: no     Intensity: mild, moderate    Progression of Symptoms:  improving and intermittent    Accompanying Signs & Symptoms:   Heart palpitations: no   Nausea, vomiting: YES- nausea  Weakness in arms or legs: no   Fatigue: YES  Vision or speech changes: no   Ringing in ears (Tinnitus): no   Hearing Loss: no     History:   Head trauma/concussion hx: no   Previous similar symptoms: no   Recent bleeding history: no     Precipitating factors:   Worse with activity or head movement: YES  Any new medications (BP?): no   Alcohol/drug abuse/withdrawal: no     Alleviating factors:   Does staying in a fixed position give relief:  YES    Therapies Tried and outcome: none    Concern - bloating  Onset: 1 week    Description:   Feels full with small amount of food. Bloated after    Intensity: mild    Progression of Symptoms:  same    Accompanying Signs & Symptoms:  fatigue    Previous history of similar problem:   none    Precipitating factors:   Worsened by: eating,drinking    Alleviating factors:  Improved by: none    Therapies Tried and outcome: none    Problem list and histories reviewed & adjusted, as indicated.  Additional history: as documented    Labs reviewed in EPIC    Reviewed and updated as needed this visit by clinical staff       Reviewed and updated as needed this visit by Provider         ROS:  CONSTITUTIONAL:NEGATIVE for fever, chills, change in weight  EYES: NEGATIVE for vision changes or irritation  ENT/MOUTH: NEGATIVE for ear, mouth and throat problems  RESP:NEGATIVE for significant cough or SOB  CV: NEGATIVE for chest pain, palpitations or peripheral edema  GI: POSITIVE for gas or bloating and poor  appetite  NEURO: POSITIVE for dizziness/lightheadedness    OBJECTIVE:                                                    /78 (BP Location: Left arm, Patient Position: Sitting, Cuff Size: Adult Regular)  Pulse 78  Temp 97  F (36.1  C) (Tympanic)  Resp 16  Wt 135 lb 8 oz (61.5 kg)  LMP 11/12/2018 (Within Weeks)  SpO2 100%  BMI 23.81 kg/m2  Body mass index is 23.81 kg/(m^2).  GENERAL APPEARANCE: healthy, alert and no distress  EYES: Eyes grossly normal to inspection, fundi benign-no diabetic or hypertensive changes seen, PERRL and conjunctivae and sclerae normal  HENT: ear canals and TM's normal, nose and mouth without ulcers or lesions, oral mucous membranes moist and oropharynx clear  NECK: no adenopathy, no asymmetry, masses, or scars, thyroid normal to palpation and no bruits  RESP: lungs clear to auscultation - no rales, rhonchi or wheezes  CV: regular rates and rhythm, normal S1 S2, no S3 or S4 and no murmur, click or rub  ABDOMEN: soft, nontender, without hepatosplenomegaly or masses and bowel sounds normal  NEURO: Normal strength and tone, mentation intact, speech normal and Romberg negative    Diagnostic test results:  Lab: see below, results pending     ASSESSMENT/PLAN:                                                        ICD-10-CM    1. Dizziness R42 CBC with platelets   2. Abdominal bloating R14.0 Comprehensive metabolic panel (BMP + Alb, Alk Phos, ALT, AST, Total. Bili, TP)   3. Hypothyroidism due to acquired atrophy of thyroid E03.4 TSH with free T4 reflex   4. Hypercholesterolemia E78.00 Lipid panel reflex to direct LDL Fasting       Follow up with Provider - 2 weeks if not resolving   Patient Instructions   Avoid changing positions quickly or turning head too quickly      Teo Cnoley MD  [facility]

## 2018-11-26 ENCOUNTER — TELEPHONE (OUTPATIENT)
Dept: FAMILY MEDICINE | Facility: CLINIC | Age: 48
End: 2018-11-26

## 2018-11-26 NOTE — TELEPHONE ENCOUNTER
Problem: Nausea/Vomiting (Adult)  Goal: Identify Related Risk Factors and Signs and Symptoms  Related risk factors and signs and symptoms are identified upon initiation of Human Response Clinical Practice Guideline (CPG).   Outcome: No Change  Status: Pt on 6a for headache w/ photophobia, nausea, and tingling sensation on cheeks.  VS: Stable ex tachycardia w/in parameters. CCM in place.   Neuros: tingling on cheeks/BLE. Complains of photophobia. Dizziness. Otherwise intact.    GI: Nausea. Gave reglan x1 and zofran x1 to partial relief. Voids spontaneously. No emesis this evening.  : BM today. Tolerating regular diet with fair appetite.  IV: PIV with NS at 50 ml/hr.  Activity: SBA with GB  Pain: Severe migraine treated per DHE protocol. 0.5 mg given this evening, making the total 2.0 mg today. Also using tramadol, tylenol, and zyprexa. Pt also uses medical cannabis for relief-- lock box in place at bedside.   Respiratory/Trach: LS clear. Sats good on room air.    Skin: Intact  Social: /kids present part of evening. Concerned about husbands upcoming trip- pt would like to know more about when she will be leaving so they can plan appropriately.   Plan of care: Continue to monitor and follow POC.            The patient called and the following lab results were gone over with her.       Inocencio Hanson,     I just wanted to let you know that your lab results have been reviewed and are attached.     Metabolic panel is normal   Thyroid test (TSH) shows good replacement with current dose of Levothyroxine   Cholesterol panel shows LDL just over goal of 100, improved from last test   CBC is normal     Please let me know if you have any questions.     Sincerely,     Teo Conley MD

## 2018-12-12 ENCOUNTER — OFFICE VISIT (OUTPATIENT)
Dept: FAMILY MEDICINE | Facility: CLINIC | Age: 48
End: 2018-12-12
Payer: COMMERCIAL

## 2018-12-12 VITALS
DIASTOLIC BLOOD PRESSURE: 70 MMHG | BODY MASS INDEX: 23.99 KG/M2 | SYSTOLIC BLOOD PRESSURE: 114 MMHG | OXYGEN SATURATION: 99 % | RESPIRATION RATE: 16 BRPM | WEIGHT: 136.5 LBS | HEART RATE: 76 BPM | TEMPERATURE: 98.2 F

## 2018-12-12 DIAGNOSIS — E03.4 HYPOTHYROIDISM DUE TO ACQUIRED ATROPHY OF THYROID: ICD-10-CM

## 2018-12-12 DIAGNOSIS — R42 DIZZINESS: Primary | ICD-10-CM

## 2018-12-12 PROCEDURE — 99213 OFFICE O/P EST LOW 20 MIN: CPT | Performed by: FAMILY MEDICINE

## 2018-12-12 NOTE — PROGRESS NOTES
SUBJECTIVE:   Valentin Butts is a 48 year old female who presents to clinic today for the following health issues:    Dizziness follow up- somewhat improved  Onset: 3 weeks intermittent    Description:   Do you feel faint:  no   Does it feel like the surroundings (bed, room) are moving: YES  Unsteady/off balance: no   Have you passed out or fallen: no     Intensity: mild, moderate    Progression of Symptoms:  improving and intermittent    Accompanying Signs & Symptoms:   Heart palpitations: YES- chest pain yesterday 6pm. Took baby aspirin  Nausea, vomiting: YES- nausea has decreased  Weakness in arms or legs: no   Fatigue: YES  Vision or speech changes: no   Ringing in ears (Tinnitus): no   Hearing Loss: no     History:   Head trauma/concussion hx: no   Previous similar symptoms: no   Recent bleeding history: no     Precipitating factors:   Worse with activity or head movement: YES  Any new medications (BP?): no   Alcohol/drug abuse/withdrawal: no     Alleviating factors:   Does staying in a fixed position give relief:  YES    Therapies Tried and outcome: none      Concern - bloating  Onset: 1 week    Description:   Feels full with small amount of food. Bloated after    Intensity: mild    Progression of Symptoms:  same    Accompanying Signs & Symptoms:  fatigue    Previous history of similar problem:   none    Precipitating factors:   Worsened by: eating,drinking    Alleviating factors:  Improved by: none    Therapies Tried and outcome: none    Problem list and histories reviewed & adjusted, as indicated.  Additional history: as documented    Labs reviewed in EPIC    Reviewed and updated as needed this visit by clinical staff  Tobacco  Allergies  Meds  Med Hx  Surg Hx  Fam Hx  Soc Hx      Reviewed and updated as needed this visit by Provider         ROS:  CONSTITUTIONAL:NEGATIVE for fever, chills, change in weight  EYES: NEGATIVE for vision changes or irritation  ENT/MOUTH: NEGATIVE for ear, mouth and throat  problems  RESP:NEGATIVE for significant cough or SOB  CV: NEGATIVE for chest pain, palpitations or peripheral edema  GI: POSITIVE for gas or bloating and poor appetite  NEURO: POSITIVE for dizziness/lightheadedness    OBJECTIVE:                                                    /70 (BP Location: Left arm, Patient Position: Sitting, Cuff Size: Adult Regular)   Pulse 76   Temp 98.2  F (36.8  C) (Tympanic)   Resp 16   Wt 61.9 kg (136 lb 8 oz)   LMP 11/12/2018 (Within Weeks)   SpO2 99%   BMI 23.99 kg/m    Body mass index is 23.99 kg/m .  GENERAL APPEARANCE: healthy, alert and no distress  EYES: Eyes grossly normal to inspection, fundi benign-no diabetic or hypertensive changes seen, PERRL and conjunctivae and sclerae normal  HENT: ear canals and TM's normal, nose and mouth without ulcers or lesions, oral mucous membranes moist and oropharynx clear  NECK: no adenopathy, no asymmetry, masses, or scars, thyroid normal to palpation and no bruits  RESP: lungs clear to auscultation - no rales, rhonchi or wheezes  CV: regular rates and rhythm, normal S1 S2, no S3 or S4 and no murmur, click or rub  ABDOMEN: soft, nontender, without hepatosplenomegaly or masses and bowel sounds normal  NEURO: Normal strength and tone, mentation intact, speech normal and Romberg negative    Diagnostic test results:  Lab: none     ASSESSMENT/PLAN:                                                        ICD-10-CM    1. Dizziness R42    2. Hypothyroidism due to acquired atrophy of thyroid E03.4        Follow up with Provider - 1 mo if not improving  Due for PE in March 2019  Patient Instructions   Avoid sudden position changes      Teo Conley MD  [facility]

## 2019-03-13 ENCOUNTER — OFFICE VISIT (OUTPATIENT)
Dept: FAMILY MEDICINE | Facility: CLINIC | Age: 49
End: 2019-03-13
Payer: COMMERCIAL

## 2019-03-13 VITALS
BODY MASS INDEX: 22.62 KG/M2 | SYSTOLIC BLOOD PRESSURE: 120 MMHG | TEMPERATURE: 98.1 F | HEIGHT: 64 IN | OXYGEN SATURATION: 99 % | WEIGHT: 132.5 LBS | HEART RATE: 80 BPM | DIASTOLIC BLOOD PRESSURE: 70 MMHG

## 2019-03-13 DIAGNOSIS — Z00.00 ROUTINE GENERAL MEDICAL EXAMINATION AT A HEALTH CARE FACILITY: Primary | ICD-10-CM

## 2019-03-13 DIAGNOSIS — K21.9 GASTROESOPHAGEAL REFLUX DISEASE WITHOUT ESOPHAGITIS: ICD-10-CM

## 2019-03-13 DIAGNOSIS — Z12.31 ENCOUNTER FOR SCREENING MAMMOGRAM FOR BREAST CANCER: ICD-10-CM

## 2019-03-13 DIAGNOSIS — Z23 ENCOUNTER FOR IMMUNIZATION: ICD-10-CM

## 2019-03-13 DIAGNOSIS — E03.4 HYPOTHYROIDISM DUE TO ACQUIRED ATROPHY OF THYROID: ICD-10-CM

## 2019-03-13 DIAGNOSIS — R10.2 PELVIC PAIN IN FEMALE: ICD-10-CM

## 2019-03-13 DIAGNOSIS — Z11.4 ENCOUNTER FOR SCREENING FOR HIV: ICD-10-CM

## 2019-03-13 LAB
ALBUMIN SERPL-MCNC: 3.9 G/DL (ref 3.4–5)
ALBUMIN UR-MCNC: NEGATIVE MG/DL
ALP SERPL-CCNC: 85 U/L (ref 40–150)
ALT SERPL W P-5'-P-CCNC: 20 U/L (ref 0–50)
ANION GAP SERPL CALCULATED.3IONS-SCNC: 6 MMOL/L (ref 3–14)
APPEARANCE UR: CLEAR
AST SERPL W P-5'-P-CCNC: 16 U/L (ref 0–45)
BILIRUB SERPL-MCNC: 0.4 MG/DL (ref 0.2–1.3)
BILIRUB UR QL STRIP: NEGATIVE
BUN SERPL-MCNC: 7 MG/DL (ref 7–30)
CALCIUM SERPL-MCNC: 9.2 MG/DL (ref 8.5–10.1)
CHLORIDE SERPL-SCNC: 107 MMOL/L (ref 94–109)
CHOLEST SERPL-MCNC: 187 MG/DL
CO2 SERPL-SCNC: 26 MMOL/L (ref 20–32)
COLOR UR AUTO: YELLOW
CREAT SERPL-MCNC: 0.59 MG/DL (ref 0.52–1.04)
ERYTHROCYTE [DISTWIDTH] IN BLOOD BY AUTOMATED COUNT: 12.1 % (ref 10–15)
GFR SERPL CREATININE-BSD FRML MDRD: >90 ML/MIN/{1.73_M2}
GLUCOSE SERPL-MCNC: 93 MG/DL (ref 70–99)
GLUCOSE UR STRIP-MCNC: NEGATIVE MG/DL
HCT VFR BLD AUTO: 37.7 % (ref 35–47)
HDLC SERPL-MCNC: 47 MG/DL
HGB BLD-MCNC: 12.4 G/DL (ref 11.7–15.7)
HGB UR QL STRIP: ABNORMAL
KETONES UR STRIP-MCNC: NEGATIVE MG/DL
LDLC SERPL CALC-MCNC: 117 MG/DL
LEUKOCYTE ESTERASE UR QL STRIP: ABNORMAL
MCH RBC QN AUTO: 30.3 PG (ref 26.5–33)
MCHC RBC AUTO-ENTMCNC: 32.9 G/DL (ref 31.5–36.5)
MCV RBC AUTO: 92 FL (ref 78–100)
NITRATE UR QL: NEGATIVE
NON-SQ EPI CELLS #/AREA URNS LPF: ABNORMAL /LPF
NONHDLC SERPL-MCNC: 140 MG/DL
PH UR STRIP: 8.5 PH (ref 5–7)
PLATELET # BLD AUTO: 337 10E9/L (ref 150–450)
POTASSIUM SERPL-SCNC: 4.2 MMOL/L (ref 3.4–5.3)
PROT SERPL-MCNC: 7.4 G/DL (ref 6.8–8.8)
RBC # BLD AUTO: 4.09 10E12/L (ref 3.8–5.2)
RBC #/AREA URNS AUTO: ABNORMAL /HPF
SODIUM SERPL-SCNC: 139 MMOL/L (ref 133–144)
SOURCE: ABNORMAL
SP GR UR STRIP: 1.01 (ref 1–1.03)
TRIGL SERPL-MCNC: 113 MG/DL
TSH SERPL DL<=0.005 MIU/L-ACNC: 0.67 MU/L (ref 0.4–4)
UROBILINOGEN UR STRIP-ACNC: 0.2 EU/DL (ref 0.2–1)
WBC # BLD AUTO: 7.6 10E9/L (ref 4–11)
WBC #/AREA URNS AUTO: ABNORMAL /HPF

## 2019-03-13 PROCEDURE — 85027 COMPLETE CBC AUTOMATED: CPT | Performed by: FAMILY MEDICINE

## 2019-03-13 PROCEDURE — 80061 LIPID PANEL: CPT | Performed by: FAMILY MEDICINE

## 2019-03-13 PROCEDURE — 90471 IMMUNIZATION ADMIN: CPT | Performed by: FAMILY MEDICINE

## 2019-03-13 PROCEDURE — 87389 HIV-1 AG W/HIV-1&-2 AB AG IA: CPT | Performed by: FAMILY MEDICINE

## 2019-03-13 PROCEDURE — 90715 TDAP VACCINE 7 YRS/> IM: CPT | Performed by: FAMILY MEDICINE

## 2019-03-13 PROCEDURE — 80053 COMPREHEN METABOLIC PANEL: CPT | Performed by: FAMILY MEDICINE

## 2019-03-13 PROCEDURE — 84443 ASSAY THYROID STIM HORMONE: CPT | Performed by: FAMILY MEDICINE

## 2019-03-13 PROCEDURE — 36415 COLL VENOUS BLD VENIPUNCTURE: CPT | Performed by: FAMILY MEDICINE

## 2019-03-13 PROCEDURE — 99213 OFFICE O/P EST LOW 20 MIN: CPT | Mod: 25 | Performed by: FAMILY MEDICINE

## 2019-03-13 PROCEDURE — 81001 URINALYSIS AUTO W/SCOPE: CPT | Performed by: FAMILY MEDICINE

## 2019-03-13 PROCEDURE — 99396 PREV VISIT EST AGE 40-64: CPT | Mod: 25 | Performed by: FAMILY MEDICINE

## 2019-03-13 ASSESSMENT — MIFFLIN-ST. JEOR: SCORE: 1213.08

## 2019-03-13 NOTE — PROGRESS NOTES
SUBJECTIVE:   CC: Valentin Butts is an 48 year old woman who presents for preventive health visit.     Physical   Annual:     Getting at least 3 servings of Calcium per day:  Yes    Bi-annual eye exam:  NO    Dental care twice a year:  NO    Sleep apnea or symptoms of sleep apnea:  None    Diet:  Regular (no restrictions)    Frequency of exercise:  2-3 days/week    Duration of exercise:  Less than 15 minutes    Taking medications regularly:  Yes    Medication side effects:  None    Additional concerns today:  No    PHQ-2 Total Score: 0          Abdominal Pain      Duration: intermittent    Description (location/character/radiation): lower abdominal/pelvic pain cramping       Associated flank pain: None    Intensity:  moderate    Accompanying signs and symptoms:        Fever/Chills: no        Gas/Bloating: YES       Nausea/vomitting: no        Diarrhea: no        Dysuria or Hematuria: no     History (previous similar pain/trauma/previous testing): Hx of uterine fibroid, ovarian cyst in past    Precipitating or alleviating factors:       Pain worse with eating/BM/urination: epigastric pain when eating       Pain relieved by BM: no     Therapies tried and outcome: None    LMP:  Started 3/12/19    Hypothyroidism Follow-up      Since last visit, patient describes the following symptoms: Weight stable, no hair loss, no skin changes, no constipation, no loose stools      Today's PHQ-2 Score:   PHQ-2 ( 1999 [employer]) 3/13/2019   Q1: Little interest or pleasure in doing things 0   Q2: Feeling down, depressed or hopeless 0   PHQ-2 Score 0   Q1: Little interest or pleasure in doing things Not at all   Q2: Feeling down, depressed or hopeless Not at all   PHQ-2 Score 0       Abuse: Current or Past(Physical, Sexual or Emotional)- No  Do you feel safe in your environment? Yes    Social History     Tobacco Use     Smoking status: Never Smoker     Smokeless tobacco: Never Used   Substance Use Topics     Alcohol use: Yes      "Comment: occasional at functions     Alcohol Use 3/13/2019   If you drink alcohol do you typically have greater than 3 drinks per day OR greater than 7 drinks per week? No   No flowsheet data found.    Reviewed orders with patient.  Reviewed health maintenance and updated orders accordingly - Yes  Labs reviewed in [facility]    Mammogram Screening: Patient under age 50, mutual decision reflected in health maintenance.      Pertinent mammograms are reviewed under the imaging tab.  History of abnormal Pap smear: NO - age 30-65 PAP every 5 years with negative HPV co-testing recommended  PAP / HPV Latest Ref Rng & Units 1/27/2017 10/2/2012   PAP - NIL NIL   HPV 16 DNA NEG Negative -   HPV 18 DNA NEG Negative -   OTHER HR HPV NEG Negative -     Reviewed and updated as needed this visit by clinical staff  Tobacco  Allergies  Meds  Med Hx  Surg Hx  Fam Hx  Soc Hx        Reviewed and updated as needed this visit by Provider            Review of Systems  CONSTITUTIONAL: NEGATIVE for fever, chills, change in weight  INTEGUMENTARU/SKIN: NEGATIVE for worrisome rashes, moles or lesions  EYES: NEGATIVE for vision changes or irritation  ENT: NEGATIVE for ear, mouth and throat problems  RESP: NEGATIVE for significant cough or SOB  BREAST: NEGATIVE for masses, tenderness or discharge  CV: NEGATIVE for chest pain, palpitations or peripheral edema  GI: POSITIVE for gas or bloating, heartburn or reflux and Hx GERD   female: normal menses and lower abdominal cramping  MUSCULOSKELETAL: NEGATIVE for significant arthralgias or myalgia  NEURO: NEGATIVE for weakness, dizziness or paresthesias  PSYCHIATRIC: NEGATIVE for changes in mood or affect     OBJECTIVE:   /70 (Cuff Size: Adult Regular)   Pulse 80   Temp 98.1  F (36.7  C) (Tympanic)   Ht 1.613 m (5' 3.5\")   Wt 60.1 kg (132 lb 8 oz)   SpO2 99%   BMI 23.10 kg/m    Physical Exam  GENERAL: healthy, alert and no distress  EYES: Eyes grossly normal to inspection, PERRL and " conjunctivae and sclerae normal  HENT: ear canals and TM's normal, nose and mouth without ulcers or lesions  NECK: no adenopathy, no asymmetry, masses, or scars and thyroid normal to palpation  RESP: lungs clear to auscultation - no rales, rhonchi or wheezes  BREAST: normal without masses, tenderness or nipple discharge and no palpable axillary masses or adenopathy  CV: regular rate and rhythm, normal S1 S2, no S3 or S4, no murmur, click or rub, no peripheral edema and peripheral pulses strong  ABDOMEN: soft, nontender, no hepatosplenomegaly, no masses and bowel sounds normal  MS: no gross musculoskeletal defects noted, no edema  SKIN: no suspicious lesions or rashes  NEURO: Normal strength and tone, mentation intact and speech normal  PSYCH: mentation appears normal, affect normal/bright    Diagnostic Test Results:  Lab: see below, results pending    ASSESSMENT/PLAN:   Valentin was seen today for physical.    Diagnoses and all orders for this visit:    Routine general medical examination at a health care facility  -     Comprehensive metabolic panel  -     *UA reflex to Microscopic  -     Lipid panel reflex to direct LDL Fasting  -     CBC with platelets  -     HIV Antigen Antibody Combo    Hypothyroidism due to acquired atrophy of thyroid  -     TSH with free T4 reflex    Gastroesophageal reflux disease without esophagitis  -     ranitidine (ZANTAC) 150 MG tablet; Take 1 tablet (150 mg) by mouth At Bedtime    Pelvic pain in female  -     OB/GYN REFERRAL    Encounter for screening mammogram for breast cancer  -     MA Screening Digital Bilateral; Future    Encounter for screening for HIV  -     HIV Antigen Antibody Combo    Encounter for immunization  -     TDAP VACCINE (ADACEL)  -     VACCINE ADMINISTRATION, INITIAL        COUNSELING:  Reviewed preventive health counseling, as reflected in patient instructions       Regular exercise       Healthy diet/nutrition       Immunizations    Vaccinated for: TDAP      Refer  "to OB/Gyn for consult and evaluation         BP Readings from Last 1 Encounters:   03/13/19 120/70     Estimated body mass index is 23.1 kg/m  as calculated from the following:    Height as of this encounter: 1.613 m (5' 3.5\").    Weight as of this encounter: 60.1 kg (132 lb 8 oz).           reports that  has never smoked. she has never used smokeless tobacco.      Counseling Resources:  ATP IV Guidelines  Pooled Cohorts Equation Calculator  Breast Cancer Risk Calculator  FRAX Risk Assessment  ICSI Preventive Guidelines  Dietary Guidelines for Americans, 2010  [employer]'s MyPlate  ASA Prophylaxis  Lung CA Screening    Teo Conley MD  [facility]  Answers for HPI/ROS submitted by the patient on 3/13/2019   Annual Exam:  Would you say that in general your health is: : Very Good  Now thinking about your physical health, which includes physical illness and injury; for how many days during the past 30 days was your physical health not good?: None  Now thinking about your mental health, which includes stress, depression, and problems with emotions; for how many days during the past 30 days was your mental health not good?: None  During the past 30 days, for about how many days did poor physical or mental health keep you from doing your usual activities, such as self-care, work, or recreation?: None    "

## 2019-03-14 LAB — HIV 1+2 AB+HIV1 P24 AG SERPL QL IA: NONREACTIVE

## 2019-03-20 ENCOUNTER — TELEPHONE (OUTPATIENT)
Dept: FAMILY MEDICINE | Facility: CLINIC | Age: 49
End: 2019-03-20

## 2019-04-09 ENCOUNTER — HOSPITAL ENCOUNTER (OUTPATIENT)
Dept: ULTRASOUND IMAGING | Facility: CLINIC | Age: 49
Discharge: HOME | End: 2019-04-09
Attending: OBSTETRICS & GYNECOLOGY | Admitting: OBSTETRICS & GYNECOLOGY
Payer: COMMERCIAL

## 2019-04-09 ENCOUNTER — OFFICE VISIT (OUTPATIENT)
Dept: OBGYN | Facility: CLINIC | Age: 49
End: 2019-04-09
Attending: FAMILY MEDICINE
Payer: COMMERCIAL

## 2019-04-09 VITALS
HEART RATE: 106 BPM | DIASTOLIC BLOOD PRESSURE: 80 MMHG | BODY MASS INDEX: 23 KG/M2 | WEIGHT: 134.7 LBS | HEIGHT: 64 IN | SYSTOLIC BLOOD PRESSURE: 110 MMHG

## 2019-04-09 DIAGNOSIS — R10.2 PELVIC PAIN IN FEMALE: ICD-10-CM

## 2019-04-09 DIAGNOSIS — Z53.9 ERRONEOUS ENCOUNTER--DISREGARD: Primary | ICD-10-CM

## 2019-04-09 PROCEDURE — 76830 TRANSVAGINAL US NON-OB: CPT

## 2019-04-09 PROCEDURE — G0463 HOSPITAL OUTPT CLINIC VISIT: HCPCS | Mod: ZF

## 2019-04-09 ASSESSMENT — ANXIETY QUESTIONNAIRES
3. WORRYING TOO MUCH ABOUT DIFFERENT THINGS: NOT AT ALL
GAD7 TOTAL SCORE: 1
5. BEING SO RESTLESS THAT IT IS HARD TO SIT STILL: NOT AT ALL
6. BECOMING EASILY ANNOYED OR IRRITABLE: NOT AT ALL
2. NOT BEING ABLE TO STOP OR CONTROL WORRYING: NOT AT ALL
7. FEELING AFRAID AS IF SOMETHING AWFUL MIGHT HAPPEN: SEVERAL DAYS
1. FEELING NERVOUS, ANXIOUS, OR ON EDGE: NOT AT ALL

## 2019-04-09 ASSESSMENT — PATIENT HEALTH QUESTIONNAIRE - PHQ9
SUM OF ALL RESPONSES TO PHQ QUESTIONS 1-9: 2
5. POOR APPETITE OR OVEREATING: NOT AT ALL

## 2019-04-09 ASSESSMENT — MIFFLIN-ST. JEOR: SCORE: 1223.06

## 2019-04-09 NOTE — NURSING NOTE
Chief Complaint   Patient presents with     Pelvic Pain     Here to discuss pelvic pain. Hx of fibroids and ovarian cyst. Pain is worse during period.        See ANASTACIO Hays 4/9/2019

## 2019-04-09 NOTE — LETTER
RE: Valentin Butts  [address]  [facility] [address]     Dear Colleague,    Thank you for referring your patient, Valentin Butts, to [facility] Please see a copy of my visit note below.    This encounter was opened in error.       This encounter was opened in error. Please disregard.    Again, thank you for allowing me to participate in the care of your patient.      Sincerely,    Maren Barrios MD

## 2019-04-10 ASSESSMENT — ANXIETY QUESTIONNAIRES: GAD7 TOTAL SCORE: 1

## 2019-04-15 ENCOUNTER — TELEPHONE (OUTPATIENT)
Dept: FAMILY MEDICINE | Facility: CLINIC | Age: 49
End: 2019-04-15

## 2019-04-15 NOTE — TELEPHONE ENCOUNTER
Reason for Call:  Other     Detailed comments: would like her US results    Phone Number Patient can be reached at: Home number on file [phone] (home)    Best Time: today    Can we leave a detailed message on this number? YES    Call taken on 4/15/2019 at 9:04 AM by AUDIE WALLER

## 2019-04-15 NOTE — TELEPHONE ENCOUNTER
Called patient and relayed US results.     -As you can see form the ultrasound report you have multiple fibroids in your uterus.  That could definitely be the source of your pelvic pain.  The ovaries look fine.  Please return to clinic to discuss management options.     Scheduled a follow up visit for 4/17 with Dr. Conley

## 2019-04-17 ENCOUNTER — OFFICE VISIT (OUTPATIENT)
Dept: FAMILY MEDICINE | Facility: CLINIC | Age: 49
End: 2019-04-17
Payer: COMMERCIAL

## 2019-04-17 VITALS
DIASTOLIC BLOOD PRESSURE: 72 MMHG | WEIGHT: 134 LBS | HEART RATE: 81 BPM | SYSTOLIC BLOOD PRESSURE: 106 MMHG | TEMPERATURE: 98.3 F | BODY MASS INDEX: 23.36 KG/M2 | RESPIRATION RATE: 16 BRPM | OXYGEN SATURATION: 99 %

## 2019-04-17 DIAGNOSIS — D25.9 UTERINE LEIOMYOMA, UNSPECIFIED LOCATION: Primary | ICD-10-CM

## 2019-04-17 DIAGNOSIS — R10.2 PELVIC PAIN IN FEMALE: ICD-10-CM

## 2019-04-17 PROCEDURE — 99213 OFFICE O/P EST LOW 20 MIN: CPT | Performed by: FAMILY MEDICINE

## 2019-04-17 NOTE — PROGRESS NOTES
SUBJECTIVE:   Valentin Butts is a 48 year old female who presents to clinic today for the following   health issues:      Ultrasound Results      Duration: done 04/09/2019    Description (location/character/radiation): abnormal findings in ultrasound    Intensity:  moderate    Accompanying signs and symptoms: pelvic pain, abdominal pain    History (similar episodes/previous evaluation): YES    Precipitating or alleviating factors: eating    Therapies tried and outcome: Ranitidine  Outcome: no   US report shows:  FINDINGS:  The uterus measures 7.2 cm x 14.2 cm x 11.0 cm. Myomatous uterus with  multiple uterine fibroids with the largest being a right fundal  fibroid measuring up to 5.3 cm.  The endometrium is within normal  limits and measures 1.1. There is no free fluid in the pelvis.  Cervical nabothian cysts.     The right ovary measures 2.4 cm x 2.3 cm x 1.0 cm and the left ovary  measures 1.1 cm x 2.8 cm x 3.1 cm. Small 1.4 cm simple left ovarian  follicle.                                                                       IMPRESSION: Myomatous uterus with multiple uterine fibroids with the  largest being a right fundal fibroid measuring up to 5.3 cm.         Abdominal Pain      Duration: intermittent    Description (location/character/radiation): RLQ  abdominal/pelvic pain cramping       Associated flank pain: None    Intensity:  moderate    Accompanying signs and symptoms:        Fever/Chills: no        Gas/Bloating: YES       Nausea/vomitting: no        Diarrhea: no        Dysuria or Hematuria: no     History (previous similar pain/trauma/previous testing): Hx of uterine fibroid, ovarian cyst in past    Precipitating or alleviating factors:       Pain worse with eating/BM/urination: epigastric pain when eating       Pain relieved by BM: no     Therapies tried and outcome: None    LMP:  04/05/2019        Additional history: as documented    Reviewed  and updated as needed this visit by clinical  staff  Tobacco  Allergies  Meds  Med Hx  Surg Hx  Fam Hx  Soc Hx        Reviewed and updated as needed this visit by Provider         Labs reviewed in EPIC    ROS:  CONSTITUTIONAL:NEGATIVE for fever, chills, change in weight  GI: POSITIVE for abdominal pain suprapubic  : normal menstrual cycles    OBJECTIVE:                                                    /72 (BP Location: Left arm, Patient Position: Sitting, Cuff Size: Adult Regular)   Pulse 81   Temp 98.3  F (36.8  C) (Tympanic)   Resp 16   Wt 60.8 kg (134 lb)   LMP 04/05/2019 (Exact Date)   SpO2 99%   BMI 23.36 kg/m    Body mass index is 23.36 kg/m .  GENERAL APPEARANCE: healthy, alert and no distress  ABDOMEN: soft, nontender, without hepatosplenomegaly or masses and bowel sounds normal   (female): exam deferred    Diagnostic test results:  none      ASSESSMENT/PLAN:                                                        ICD-10-CM    1. Uterine leiomyoma, unspecified location D25.9 OB/GYN REFERRAL   2. Pelvic pain in female R10.2 OB/GYN REFERRAL       Follow up with Provider - 2 mo as needed   I discussed with Pt. Most definitive treatment would be for hysterectomy  Sometimes fibroids can be treated with injections of sclerosing medication to cause them to shrink but not all can be treated that way.  Patient Instructions   Schedule appt with OB/Gyn doctor to discuss treatment options      Teo Conley MD  [facility]

## 2019-05-10 ENCOUNTER — OFFICE VISIT (OUTPATIENT)
Dept: OBGYN | Facility: CLINIC | Age: 49
End: 2019-05-10
Attending: FAMILY MEDICINE
Payer: COMMERCIAL

## 2019-05-10 VITALS
SYSTOLIC BLOOD PRESSURE: 134 MMHG | HEART RATE: 112 BPM | WEIGHT: 136 LBS | BODY MASS INDEX: 23.71 KG/M2 | DIASTOLIC BLOOD PRESSURE: 83 MMHG

## 2019-05-10 DIAGNOSIS — D25.9 UTERINE LEIOMYOMA, UNSPECIFIED LOCATION: Primary | ICD-10-CM

## 2019-05-10 NOTE — PROGRESS NOTES
[facility]  Gynecology Visit    Reason for Consult: Uterine fibroids  Consulting Provider: Teo Conley MD    HPI:    Valentin Butts is a 48 year old , here for fibroids. She had an US due to pelvic pain and it was notable for myomatous uterus with multiple fibroids, largest being fundal fibroid measuring up to 5.3 cm. Uterus measures 7.2 x 14.2 x 11.0 cm.  She is concerned because her sister needed a surgery for her fibroids and she is worried about them increasing in size.   She reports regular menstrual periods, about every 30 days. She has 5 days of bleeding, the first 2 being more painful. Sometimes takes ibuprofen for the painful days. Denies heavy periods or intermenstrual bleeding. She had a tubal ligation after her last child was born. She has a distant history of fibroids but hasn't been bothered by them in the past. She reports mostly being bothered by knowing the size of this fibroids. Outside of her periods she has rare intermittent pelvic pain that lasts for a few seconds and feels like tightening muscles. Denies any constipation, urinary incontinence, difficulty urinating, or painful intercourse.   She does not use any birth control as she has had a tubal ligation. She is wary of trying medications as she doesn't take many medications and thinks of herself as very healthy.     GYN History  - LMP: Patient's last menstrual period was 2019.  - Menses: Regular, last 5 days. First two days are painful  - Pap Smears:     Lab Results   Component Value Date    PAP NIL 2017    PAP NIL 10/02/2012     - Contraception: BTL  - Sexual Activity/Concerns: None  - Hx STIs/UTIs: None    OBHx  OB History    Para Term  AB Living   3 3 3 0 0 0   SAB TAB Ectopic Multiple Live Births   0 0 0 0 0      # Outcome Date GA Lbr Jeffrey/2nd Weight Sex Delivery Anes PTL Lv   3 Term            2 Term            1 Term                PMHx: No past medical history on file.  PSHx:   Past Surgical  History:   Procedure Laterality Date     GYN SURGERY  2000    Tubal Ligation     Meds:   Current Outpatient Medications   Medication     ranitidine (ZANTAC) 150 MG tablet     SYNTHROID 88 MCG tablet     No current facility-administered medications for this visit.      Allergies:   No Known Allergies  SocHx:   Social History     Socioeconomic History     Marital status:      Spouse name: Not on file     Number of children: Not on file     Years of education: Not on file     Highest education level: Not on file   Occupational History     Not on file   Social Needs     Financial resource strain: Not on file     Food insecurity:     Worry: Not on file     Inability: Not on file     Transportation needs:     Medical: Not on file     Non-medical: Not on file   Tobacco Use     Smoking status: Never Smoker     Smokeless tobacco: Never Used   Substance and Sexual Activity     Alcohol use: Yes     Comment: occasional at functions     Drug use: No     Sexual activity: Yes     Partners: Male   Lifestyle     Physical activity:     Days per week: Not on file     Minutes per session: Not on file     Stress: Not on file   Relationships     Social connections:     Talks on phone: Not on file     Gets together: Not on file     Attends Faith service: Not on file     Active member of club or organization: Not on file     Attends meetings of clubs or organizations: Not on file     Relationship status: Not on file     Intimate partner violence:     Fear of current or ex partner: Not on file     Emotionally abused: Not on file     Physically abused: Not on file     Forced sexual activity: Not on file   Other Topics Concern     Parent/sibling w/ CABG, MI or angioplasty before 65F 55M? No   Social History Narrative     Not on file       FamHx:    Family History   Problem Relation Age of Onset     Diabetes Father        ROS: 10-Point ROS negative except as noted in HPI    Physical Exam  /83   Pulse 112   Wt 61.7 kg (136  lb)   LMP 2019   Breastfeeding? No   BMI 23.71 kg/m    Gen: Well-appearing, NAD  Abd: Soft, non-tender, non-distended. Uterine fundus palpated at level of ASIS    FINDINGS:  The uterus measures 7.2 cm x 14.2 cm x 11.0 cm. Myomatous uterus with  multiple uterine fibroids with the largest being a right fundal  fibroid measuring up to 5.3 cm.  The endometrium is within normal  limits and measures 1.1. There is no free fluid in the pelvis.  Cervical nabothian cysts.     The right ovary measures 2.4 cm x 2.3 cm x 1.0 cm and the left ovary  measures 1.1 cm x 2.8 cm x 3.1 cm. Small 1.4 cm simple left ovarian  follicle.                                                                       IMPRESSION: Myomatous uterus with multiple uterine fibroids with the  largest being a right fundal fibroid measuring up to 5.3 cm    Assessment/Plan:  Valentin Butts is a 48 year old  female here for fibroids.  Discussed natural course of fibroids in that they can make periods more painful and can cause heavy bleeding. Patient appears to be minimally symptomatic from her fibroids and admits that she was only worried about them after the ultrasound and that her actual menses are not very bothersome. Her sister, who patient had on the phone for some of the interview, is adamant that patient have a surgery for removal of her fibroids.  Discussed options of fibroid management including; no treatment, ibuprofen, contraception (OCPs, IUD, patch, Nuva Ring), and surgical options. Given that patient's fibroids are minimally symptomatic and she is not having bulk symptoms or heavy bleeding, recommended conservative treatment and would not recommend surgery at this time.  Recommended trying scheduled ibuprofen during her period or possibly a Mirena IUD. She does not have any medical conditions that preclude her from combination hormonal contraception though did discuss that her age does put her at a slightly higher risk for  DVT/stroke. Recommended a Mirena IUD as she could use until menopause. Discussed slightly increased risk of difficult placement or expulsion due to fibroids. Patient is not interested in starting contraception at this time. She will try using ibuprofen scheduled but will consider Mirena.       Return to clinic prn     Staffed with Dr. Vinh Mathews MD  Ob/Gyn, PGY-1  5/10/2019, 4:22 PM    Patient was seen by the resident in [facility].  I reviewed the history & exam.  The patient's assessment and plan were made jointly.    Kristina Justice MD MPH

## 2019-05-21 ENCOUNTER — TELEPHONE (OUTPATIENT)
Dept: OBGYN | Facility: CLINIC | Age: 49
End: 2019-05-21

## 2019-05-21 NOTE — TELEPHONE ENCOUNTER
Valentin left message on triage voicemail stating desire to further discuss management of condition she was seen for with Dr. Mathews on 5/10 (fibroids).    Returned call to patient but reached voicemail. Left message to call triage.

## 2019-05-21 NOTE — TELEPHONE ENCOUNTER
"Patient called to discuss surgery options for fibroid management.   Non-surgical management was recommended by Dr. Mathews when patient was in clinic on 5/10. Patient was in agreement, but now is experiencing heavy bleeding with this cycle which is the consistency of \"jello\".    Advised patient I will route her question to Dr. Mathews and she should expect a call from her in the coming days.    "

## 2019-05-29 ENCOUNTER — NURSE TRIAGE (OUTPATIENT)
Dept: FAMILY MEDICINE | Facility: CLINIC | Age: 49
End: 2019-05-29

## 2019-05-29 NOTE — PATIENT INSTRUCTIONS
She may see UC today or seek care at ED if sever pain, breathing problems ,fever or worsening symptoms. Pt verbalized undestating and agreement with plan.

## 2019-05-29 NOTE — TELEPHONE ENCOUNTER
"    Additional Information    Negative: SEVERE chest pain    Negative: Pain also present in shoulder(s) or arm(s) or jaw    Negative: Difficulty breathing    Negative: Cocaine use within last 3 days    Negative: History of prior 'blood clot' in leg or lungs (i.e., deep vein thrombosis, pulmonary embolism)    Negative: Recent illness requiring prolonged bed rest (i.e., immobilization)    Negative: Hip or leg fracture in past 2 months (e.g, or had cast on leg or ankle)    Negative: Major surgery in the past month    Negative: Recent long-distance travel with prolonged time in car, bus, plane, or train (i.e., within past 2 weeks; 6 or more hours duration)    Negative: Heart beating irregularly or very rapidly    Negative: Followed an injury to chest    Intermittent mild chest pain lasting a few seconds each time    Answer Assessment - Initial Assessment Questions  1. LOCATION: \"Where does it hurt?\"        Left side by breast  2. RADIATION: \"Does the pain go anywhere else?\" (e.g., into neck, jaw, arms, back)      Neck pressure and left arm  3. ONSET: \"When did the chest pain begin?\" (Minutes, hours or days)       This morning 10:30 or 11:00 AM  4. PATTERN \"Does the pain come and go, or has it been constant since it started?\"  \"Does it get worse with exertion?\"       Unknown triggers  5. DURATION: \"How long does it last\" (e.g., seconds, minutes, hours)      On and off  6. SEVERITY: \"How bad is the pain?\"  (e.g., Scale 1-10; mild, moderate, or severe)     - MILD (1-3): doesn't interfere with normal activities      - MODERATE (4-7): interferes with normal activities or awakens from sleep     - SEVERE (8-10): excruciating pain, unable to do any normal activities        mild  7. CARDIAC RISK FACTORS: \"Do you have any history of heart problems or risk factors for heart disease?\" (e.g., prior heart attack, angina; high blood pressure, diabetes, being overweight, high cholesterol, smoking, or strong family history of heart " "disease)      none  8. PULMONARY RISK FACTORS: \"Do you have any history of lung disease?\"  (e.g., blood clots in lung, asthma, emphysema, birth control pills)      none  9. CAUSE: \"What do you think is causing the chest pain?\"      unknown  10. OTHER SYMPTOMS: \"Do you have any other symptoms?\" (e.g., dizziness, nausea, vomiting, sweating, fever, difficulty breathing, cough)        none  11. PREGNANCY: \"Is there any chance you are pregnant?\" \"When was your last menstrual period?\"        none    Protocols used: CHEST PAIN-A-OH      "

## 2019-06-12 DIAGNOSIS — K21.9 GASTROESOPHAGEAL REFLUX DISEASE WITHOUT ESOPHAGITIS: ICD-10-CM

## 2019-06-12 NOTE — TELEPHONE ENCOUNTER
"Requested Prescriptions   Pending Prescriptions Disp Refills     ranitidine (ZANTAC) 150 MG tablet  Last Written Prescription Date:  3/13/2019  Last Fill Quantity: 30 tablet,  # refills: 1   Last office visit: 4/17/2019 with prescribing provider:  Yael   Future Office Visit:     30 tablet 1     Sig: Take 1 tablet (150 mg) by mouth At Bedtime       H2 Blockers Protocol Passed - 6/12/2019 11:39 AM        Passed - Patient is age 12 or older        Passed - Recent (12 mo) or future (30 days) visit within the authorizing provider's specialty     Patient had office visit in the last 12 months or has a visit in the next 30 days with authorizing provider or within the authorizing provider's specialty.  See \"Patient Info\" tab in inbasket, or \"Choose Columns\" in Meds & Orders section of the refill encounter.              Passed - Medication is active on med list           "

## 2019-06-14 NOTE — TELEPHONE ENCOUNTER
Prescription approved per [facility] Ref[facility] Protocol for 12 months of refills since last appointment, which was 4/17/2019.

## 2019-09-11 ENCOUNTER — TELEPHONE (OUTPATIENT)
Dept: OBGYN | Facility: CLINIC | Age: 49
End: 2019-09-11

## 2019-09-11 NOTE — TELEPHONE ENCOUNTER
Pt requesting office visit and ultrasound report be faxed to her personally. Sent to pt fax number : [phone]

## 2019-10-03 ENCOUNTER — HEALTH MAINTENANCE LETTER (OUTPATIENT)
Age: 49
End: 2019-10-03

## 2020-07-22 ENCOUNTER — TRANSFERRED RECORDS (OUTPATIENT)
Dept: HEALTH INFORMATION MANAGEMENT | Facility: CLINIC | Age: 50
End: 2020-07-22

## 2020-09-02 ENCOUNTER — TRANSFERRED RECORDS (OUTPATIENT)
Dept: HEALTH INFORMATION MANAGEMENT | Facility: CLINIC | Age: 50
End: 2020-09-02

## 2020-09-24 ENCOUNTER — TRANSFERRED RECORDS (OUTPATIENT)
Dept: HEALTH INFORMATION MANAGEMENT | Facility: CLINIC | Age: 50
End: 2020-09-24

## 2020-10-05 ENCOUNTER — TRANSFERRED RECORDS (OUTPATIENT)
Dept: HEALTH INFORMATION MANAGEMENT | Facility: CLINIC | Age: 50
End: 2020-10-05

## 2020-10-30 ENCOUNTER — TRANSFERRED RECORDS (OUTPATIENT)
Dept: HEALTH INFORMATION MANAGEMENT | Facility: CLINIC | Age: 50
End: 2020-10-30

## 2020-11-07 ENCOUNTER — HEALTH MAINTENANCE LETTER (OUTPATIENT)
Age: 50
End: 2020-11-07

## 2021-01-30 ENCOUNTER — HEALTH MAINTENANCE LETTER (OUTPATIENT)
Age: 51
End: 2021-01-30

## 2021-09-05 ENCOUNTER — HEALTH MAINTENANCE LETTER (OUTPATIENT)
Age: 51
End: 2021-09-05

## 2021-12-26 ENCOUNTER — HEALTH MAINTENANCE LETTER (OUTPATIENT)
Age: 51
End: 2021-12-26

## 2022-02-20 ENCOUNTER — HEALTH MAINTENANCE LETTER (OUTPATIENT)
Age: 52
End: 2022-02-20

## 2022-10-23 ENCOUNTER — HEALTH MAINTENANCE LETTER (OUTPATIENT)
Age: 52
End: 2022-10-23

## 2023-04-02 ENCOUNTER — HEALTH MAINTENANCE LETTER (OUTPATIENT)
Age: 53
End: 2023-04-02

## 2023-09-11 ENCOUNTER — LAB REQUISITION (OUTPATIENT)
Dept: LAB | Facility: CLINIC | Age: 53
End: 2023-09-11
Payer: COMMERCIAL

## 2023-09-11 DIAGNOSIS — Z00.00 ENCOUNTER FOR GENERAL ADULT MEDICAL EXAMINATION WITHOUT ABNORMAL FINDINGS: ICD-10-CM

## 2023-09-11 LAB
CHOLEST SERPL-MCNC: 227 MG/DL
HDLC SERPL-MCNC: 62 MG/DL
LDLC SERPL CALC-MCNC: 147 MG/DL
NONHDLC SERPL-MCNC: 165 MG/DL
TRIGL SERPL-MCNC: 91 MG/DL
TSH SERPL DL<=0.005 MIU/L-ACNC: 0.63 UIU/ML (ref 0.3–4.2)

## 2023-09-11 PROCEDURE — 86706 HEP B SURFACE ANTIBODY: CPT | Mod: ORL | Performed by: INTERNAL MEDICINE

## 2023-09-11 PROCEDURE — 84443 ASSAY THYROID STIM HORMONE: CPT | Mod: ORL | Performed by: INTERNAL MEDICINE

## 2023-09-11 PROCEDURE — 87340 HEPATITIS B SURFACE AG IA: CPT | Mod: ORL | Performed by: INTERNAL MEDICINE

## 2023-09-11 PROCEDURE — 80061 LIPID PANEL: CPT | Mod: ORL | Performed by: INTERNAL MEDICINE

## 2023-09-11 PROCEDURE — 87389 HIV-1 AG W/HIV-1&-2 AB AG IA: CPT | Mod: ORL | Performed by: INTERNAL MEDICINE

## 2023-09-12 LAB
HBV SURFACE AB SERPL IA-ACNC: 0 M[IU]/ML
HBV SURFACE AB SERPL IA-ACNC: NONREACTIVE M[IU]/ML
HBV SURFACE AG SERPL QL IA: NONREACTIVE
HIV 1+2 AB+HIV1 P24 AG SERPL QL IA: NONREACTIVE

## 2023-09-19 ENCOUNTER — LAB REQUISITION (OUTPATIENT)
Dept: LAB | Facility: CLINIC | Age: 53
End: 2023-09-19
Payer: COMMERCIAL

## 2023-09-19 DIAGNOSIS — Z00.00 ENCOUNTER FOR GENERAL ADULT MEDICAL EXAMINATION WITHOUT ABNORMAL FINDINGS: ICD-10-CM

## 2023-09-19 LAB — HEMOCCULT STL QL IA: NEGATIVE

## 2023-09-19 PROCEDURE — 82274 ASSAY TEST FOR BLOOD FECAL: CPT | Mod: ORL | Performed by: INTERNAL MEDICINE

## 2024-09-23 ENCOUNTER — LAB REQUISITION (OUTPATIENT)
Dept: LAB | Facility: CLINIC | Age: 54
End: 2024-09-23
Payer: COMMERCIAL

## 2024-09-23 ENCOUNTER — MEDICAL CORRESPONDENCE (OUTPATIENT)
Dept: HEALTH INFORMATION MANAGEMENT | Facility: CLINIC | Age: 54
End: 2024-09-23

## 2024-09-23 DIAGNOSIS — E03.8 OTHER SPECIFIED HYPOTHYROIDISM: ICD-10-CM

## 2024-09-23 DIAGNOSIS — E78.00 PURE HYPERCHOLESTEROLEMIA, UNSPECIFIED: ICD-10-CM

## 2024-09-23 PROCEDURE — 80061 LIPID PANEL: CPT | Mod: ORL | Performed by: FAMILY MEDICINE

## 2024-09-23 PROCEDURE — 84439 ASSAY OF FREE THYROXINE: CPT | Mod: ORL | Performed by: FAMILY MEDICINE

## 2024-09-23 PROCEDURE — 84443 ASSAY THYROID STIM HORMONE: CPT | Mod: ORL | Performed by: FAMILY MEDICINE

## 2024-09-24 ENCOUNTER — TRANSCRIBE ORDERS (OUTPATIENT)
Dept: OTHER | Age: 54
End: 2024-09-24

## 2024-09-24 DIAGNOSIS — M25.531 RIGHT WRIST PAIN: Primary | ICD-10-CM

## 2024-09-24 LAB
CHOLEST SERPL-MCNC: 221 MG/DL
FASTING STATUS PATIENT QL REPORTED: ABNORMAL
HDLC SERPL-MCNC: 60 MG/DL
LDLC SERPL CALC-MCNC: 140 MG/DL
NONHDLC SERPL-MCNC: 161 MG/DL
T4 FREE SERPL-MCNC: 1.56 NG/DL (ref 0.9–1.7)
TRIGL SERPL-MCNC: 105 MG/DL
TSH SERPL DL<=0.005 MIU/L-ACNC: 0.28 UIU/ML (ref 0.3–4.2)

## 2024-09-25 ENCOUNTER — PATIENT OUTREACH (OUTPATIENT)
Dept: CARE COORDINATION | Facility: CLINIC | Age: 54
End: 2024-09-25
Payer: COMMERCIAL

## 2024-09-26 ENCOUNTER — TELEPHONE (OUTPATIENT)
Dept: ORTHOPEDICS | Facility: CLINIC | Age: 54
End: 2024-09-26
Payer: COMMERCIAL

## 2024-09-26 NOTE — TELEPHONE ENCOUNTER
Called waitlist patient and offered an appointment with Dr. Anderson on this date 10/1/24 & time 7:20, 8:00, and 8:40am [place] [facility] [place]     Patient declined and will keep original appt with Dr. Thao on 10/5/24.

## 2024-09-30 ENCOUNTER — LAB REQUISITION (OUTPATIENT)
Dept: LAB | Facility: CLINIC | Age: 54
End: 2024-09-30
Payer: COMMERCIAL

## 2024-09-30 DIAGNOSIS — Z00.00 ENCOUNTER FOR GENERAL ADULT MEDICAL EXAMINATION WITHOUT ABNORMAL FINDINGS: ICD-10-CM

## 2024-09-30 PROCEDURE — 82274 ASSAY TEST FOR BLOOD FECAL: CPT | Mod: ORL | Performed by: FAMILY MEDICINE

## 2024-10-01 LAB — HEMOCCULT STL QL IA: NEGATIVE

## 2024-10-01 NOTE — TELEPHONE ENCOUNTER
DIAGNOSIS: RIGHT wrist pain, De Quervains    APPOINTMENT DATE: 10/5/2024    Action    Action Taken 10/1/2024 12:19pm YAS     I called pt Valentin - no outside imaging- [facility] recs are in CE.       NOTES STATUS DETAILS   OFFICE NOTE from referring provider Internal Alverto Mercado MD at [facility]    OFFICE NOTE from other specialist Internal    MEDICATION LIST Internal

## 2024-10-04 NOTE — PROGRESS NOTES
ASSESSMENT/PLAN:    (M65.4) De Quervain's tenosynovitis, right  (primary encounter diagnosis)  Comment: exam, imaging consistent w/ DeQuervain's; we discussed tx options and she was amenable to an injection; referred to hand therapy for modalities/ custom splint; precautions/ anticipatory guidance given  Plan: Hand / Upper Extremity         Injection/Arthrocentesis: R extensor         compartment 1, Hand Therapy Referral,         diclofenac (VOLTAREN) 1 % topical gel           (M25.531) Right wrist pain  Comment: see above  Plan: XR Wrist Right G/E 3 Views         Jose Ramon Thao MD  October 5, 2024  9:37 AM        Pt is a 54 year old female here today for:     HPI:   Right Wrist/ Hand pain:   Location: Right thumb    Duration:2-3 weeks    Trauma/ Fall? No    Swelling? Yes a little    Numbness/ Tingling? No   Weakness? Yes    Imaging? No    Treatment? Brace, ice,      Per Dr Mercado's note on 9/23/24:  6. Tendonitis/ De Quervain's tenosynovitis, right  Continue rest and icing.  - REFERRAL TO SPORTS MEDICINE     No past medical history on file.   Past Surgical History:   Procedure Laterality Date    GYN SURGERY  2000    Tubal Ligation      Current Outpatient Medications   Medication Sig Dispense Refill    ranitidine (ZANTAC) 150 MG tablet Take 1 tablet (150 mg) by mouth At Bedtime 90 tablet 1    SYNTHROID 88 MCG tablet   1      No Known Allergies   ROS:   Gen- no fevers/chills   Rheum - no morning stiffness   Derm - no rash/ redness   Neuro - see HPI  Remainder of ROS negative.     Exam:   There were no vitals taken for this visit.       R WRIST/HAND:   Inspection: Swelling - mild soft tissue swelling over radial wrist; Atrophy - No   Sensation: intact in median, radial, ulnar distribution   ROM:   Wrist: flexion- full/ extension- full/ pronation- full/ supination- full;   radial deviation - full; ulnar deviation- full   Hand: Full flexion at PIP/DIP, finger abduction/ adduction.   Strength: 5/5 in all motions   Bony  tenderness:   Wrist: Carpal bones: +TTP at radial styloid; Snuffbox: YES   Hand: Metacarpals: 1st CMC; Phalanges: NO   Tendons: FDS/FDP/Extensor mechanism intact   Maneuvers: -POS Finkelstein.   Other: No nodules palpated in palmar aspect.      [place] [facility] [place] personally reviewed with patient at time of visit     My impression: neg for 1st CMC OA      Procedure Note:  Pt verbally consented regarding risks including hypopigmentation. R radial wrist was sterilely prepped in usual fashion. 0.5 cc of 40mg/mL kenalog and 0.5 cc of 1% lidocaine was injected adjacent to radial styloid along 1st extensor compartment without complication. Pt tolerated procedure well.       Hand / Upper Extremity Injection/Arthrocentesis: R extensor compartment 1    Date/Time: 10/5/2024 9:25 AM    Performed by: Jose Ramon Thao MD  Authorized by: Jose Ramon Thao MD    Indications:  Pain  Needle Size:  25 G  Guidance: landmark    Condition: de Quervain's      Site:  R extensor compartment 1  Medications:  20 mg triamcinolone 40 MG/ML; 0.5 mL lidocaine (PF) 1 %  Outcome:  Tolerated well, no immediate complications  Procedure discussed: discussed risks, benefits, and alternatives    Consent Given by:  Patient  Timeout: timeout called immediately prior to procedure    Prep: patient was prepped and draped in usual sterile fashion

## 2024-10-05 ENCOUNTER — ANCILLARY PROCEDURE (OUTPATIENT)
Dept: GENERAL RADIOLOGY | Facility: CLINIC | Age: 54
End: 2024-10-05
Attending: FAMILY MEDICINE
Payer: COMMERCIAL

## 2024-10-05 ENCOUNTER — PRE VISIT (OUTPATIENT)
Dept: ORTHOPEDICS | Facility: CLINIC | Age: 54
End: 2024-10-05

## 2024-10-05 ENCOUNTER — OFFICE VISIT (OUTPATIENT)
Dept: ORTHOPEDICS | Facility: CLINIC | Age: 54
End: 2024-10-05
Payer: COMMERCIAL

## 2024-10-05 DIAGNOSIS — M25.531 RIGHT WRIST PAIN: ICD-10-CM

## 2024-10-05 DIAGNOSIS — M65.4 DE QUERVAIN'S TENOSYNOVITIS, RIGHT: Primary | ICD-10-CM

## 2024-10-05 PROCEDURE — 73110 X-RAY EXAM OF WRIST: CPT | Mod: RT | Performed by: RADIOLOGY

## 2024-10-05 PROCEDURE — 99203 OFFICE O/P NEW LOW 30 MIN: CPT | Mod: 25 | Performed by: FAMILY MEDICINE

## 2024-10-05 PROCEDURE — 20550 NJX 1 TENDON SHEATH/LIGAMENT: CPT | Mod: RT | Performed by: FAMILY MEDICINE

## 2024-10-05 RX ORDER — LIDOCAINE HYDROCHLORIDE 10 MG/ML
0.5 INJECTION, SOLUTION EPIDURAL; INFILTRATION; INTRACAUDAL; PERINEURAL
Status: SHIPPED | OUTPATIENT
Start: 2024-10-05

## 2024-10-05 RX ORDER — TRIAMCINOLONE ACETONIDE 40 MG/ML
20 INJECTION, SUSPENSION INTRA-ARTICULAR; INTRAMUSCULAR
Status: SHIPPED | OUTPATIENT
Start: 2024-10-05

## 2024-10-05 RX ADMIN — LIDOCAINE HYDROCHLORIDE 0.5 ML: 10 INJECTION, SOLUTION EPIDURAL; INFILTRATION; INTRACAUDAL; PERINEURAL at 09:25

## 2024-10-05 RX ADMIN — TRIAMCINOLONE ACETONIDE 20 MG: 40 INJECTION, SUSPENSION INTRA-ARTICULAR; INTRAMUSCULAR at 09:25

## 2024-10-05 NOTE — PATIENT INSTRUCTIONS
DE QUERVAIN'S TENOSYNOVITIS    WHAT IS DE QUERVAIN'S TENOSYNOVITIS?    De Quervain's tenosynovitis is a problem with the tendons on the thumb side of your wrist. Tendons are strong bands of tissue that attach muscle to bone. A sheath, or covering, surrounds the tendons that run from your wrist to your thumb. The tendons usually move easily through this sheath. Tenosynovitis is an irritation and thickening of this sheath that traps the tendons or makes it harder for the tendons to move through the sheath.    WHAT IS THE CAUSE?    De Quervain's tenosynovitis is usually caused by overusing your thumb or wrist. This is more likely in activities where you bend your wrist or use your thumb to  something, like skiing, typing, or construction work. Other causes of this condition include wrist injuries and rheumatoid arthritis.    WHAT ARE THE SYMPTOMS?    Symptoms may include:    Pain when you move your thumb or wrist, or make a fist  Swelling and pain on the thumb side of your wrist  Feeling or hearing creaking as you move your thumb or wrist    HOW IS IT DIAGNOSED?    Your healthcare provider will examine you and ask about your symptoms, activities, and medical history. You may have X-rays or other scans.    HOW IS IT TREATED?    You will need to stop doing the activities that cause pain until you have healed. Your healthcare provider may give you a splint that covers and protects your wrist and thumb. The splint can help relieve your symptoms. If you keep having pain, your provider may give you a shot of a steroid medicine. If these treatments don't work, you may need surgery to relieve the pain.    Your healthcare provider may recommend exercises to help you heal.    HOW CAN I TAKE CARE OF MYSELF?    To help relieve swelling and pain:    Put an ice pack, gel pack, or package of frozen vegetables wrapped in a cloth, on the area every 3 to 4 hours for up to 20 minutes at a time.  Do ice massage. To do this, first  freeze water in a Styrofoam cup, then peel the top of the cup away to expose the ice. Hold the bottom of the cup and rub the ice over your tendon for 5 to 10 minutes. Do this several times a day while you have pain.  Keep your wrist up on pillows when you sit or lie down.  Take pain medicine, such as acetaminophen, ibuprofen, or other medicine as directed by your provider. Nonsteroidal anti-inflammatory medicines (NSAIDs), such as ibuprofen, may cause stomach bleeding and other problems. These risks increase with age. Read the label and take as directed. Unless recommended by your healthcare provider, do not take for more than 10 days.  Moist heat may help relax your muscles and make it easier to move your hand and wrist. Put moist heat on the injured area for 10 to 15 minutes at a time before exercises. Moist heat includes heat patches or moist heating pads that you can purchase at most drugstores, a wet washcloth or towel that has been heated in the dryer, or a hot shower. Don t use heat if you have swelling.    Follow your healthcare provider's instructions, including any exercises recommended by your provider. Ask your provider:    How and when you will hear your test results  How long it will take to recover  What activities you should avoid, including how much you can lift, and when you can return to your normal activities  How to take care of yourself at home  What symptoms or problems you should watch for and what to do if you have them  Make sure you know when you should come back for a checkup.    HOW CAN I HELP PREVENT DE QUERVAIN'S TENOSYNOVITIS?    Warm-up exercises and stretching before activities can help prevent this problem. If your wrist hurts after exercise, putting ice on it may help keep it from getting injured.    Follow safety rules and use any protective equipment recommended for your work or sport.    Avoid activities that overuse your thumb or wrist.    Developed by [employer].  Published  by [employer].  Copyright  2014 [employer] and/or one of its subsidiaries. All rights reserved.    EXERCISES:    You may do these exercises when it is not painful to move your hand.    Opposition stretch: Rest your hand on a table, palm up. Touch the tip of your thumb to the tip of your little finger. Hold this position for 6 seconds and then release. Repeat 10 times.    Wrist stretch: Press the back of the hand on your injured side with your other hand to help bend your wrist. Hold for 15 to 30 seconds. Next, stretch the hand back by pressing the fingers in a backward direction. Hold for 15 to 30 seconds. Keep the arm on your injured side straight during this exercise. Do 3 sets.    Wrist flexion: Hold a can or hammer handle in your hand with your palm facing up. Bend your wrist upward. Slowly lower the weight and return to the starting position. Do 2 sets of 15. Gradually increase the weight of the can or weight you are holding.  Wrist radial deviation strengthening: Put your wrist in the sideways position with your thumb up. Hold a can of soup or a hammer handle and gently bend your wrist up, with the thumb reaching toward the ceiling. Slowly lower to the starting position. Do not move your forearm throughout this exercise. Do 2 sets of 15.    Wrist extension: Hold a soup can or hammer handle in your hand with your palm facing down. Slowly bend your wrist up. Slowly lower the weight down into the starting position. Do 2 sets of 15. Gradually increase the weight of the object you are holding.   strengthening: Squeeze a soft rubber ball and hold the squeeze for 5 seconds. Do 2 sets of 15.  Finger spring: Place a large rubber band around the outside of your thumb and fingers. Open your fingers to stretch the rubber band. Do 2 sets of 15.        Developed by [employer].  Published by [employer].  Copyright  2014 [employer] and/or one of its subsidiaries. All rights reserved.

## 2024-10-05 NOTE — LETTER
10/5/2024      RE: Valentin Butts  [address]  [facility] [address]     Dear Colleague,    Thank you for referring your patient, Valentin Butts, to the [facility] [place]. Please see a copy of my visit note below.    ASSESSMENT/PLAN:    (M65.4) De Quervain's tenosynovitis, right  (primary encounter diagnosis)  Comment: exam, imaging consistent w/ DeQuervain's; we discussed tx options and she was amenable to an injection; referred to hand therapy for modalities/ custom splint; precautions/ anticipatory guidance given  Plan: Hand / Upper Extremity         Injection/Arthrocentesis: R extensor         compartment 1, Hand Therapy Referral,         diclofenac (VOLTAREN) 1 % topical gel           (M25.531) Right wrist pain  Comment: see above  Plan: XR Wrist Right G/E 3 Views         Jose Ramon Thao MD  October 5, 2024  9:37 AM        Pt is a 54 year old female here today for:     HPI:   Right Wrist/ Hand pain:   Location: Right thumb    Duration:2-3 weeks    Trauma/ Fall? No    Swelling? Yes a little    Numbness/ Tingling? No   Weakness? Yes    Imaging? No    Treatment? Brace, ice,      Per Dr Mercado's note on 9/23/24:  6. Tendonitis/ De Quervain's tenosynovitis, right  Continue rest and icing.  - REFERRAL TO SPORTS MEDICINE     No past medical history on file.   Past Surgical History:   Procedure Laterality Date     GYN SURGERY  2000    Tubal Ligation      Current Outpatient Medications   Medication Sig Dispense Refill     ranitidine (ZANTAC) 150 MG tablet Take 1 tablet (150 mg) by mouth At Bedtime 90 tablet 1     SYNTHROID 88 MCG tablet   1      No Known Allergies   ROS:   Gen- no fevers/chills   Rheum - no morning stiffness   Derm - no rash/ redness   Neuro - see HPI  Remainder of ROS negative.     Exam:   There were no vitals taken for this visit.       R WRIST/HAND:   Inspection: Swelling - mild soft tissue swelling over radial wrist; Atrophy - No   Sensation: intact in  median, radial, ulnar distribution   ROM:   Wrist: flexion- full/ extension- full/ pronation- full/ supination- full;   radial deviation - full; ulnar deviation- full   Hand: Full flexion at PIP/DIP, finger abduction/ adduction.   Strength: 5/5 in all motions   Bony tenderness:   Wrist: Carpal bones: +TTP at radial styloid; Snuffbox: YES   Hand: Metacarpals: 1st CMC; Phalanges: NO   Tendons: FDS/FDP/Extensor mechanism intact   Maneuvers: -POS Finkelstein.   Other: No nodules palpated in palmar aspect.      Xray of R wrist on October 5, 2024 at [facility] [place] location - films personally reviewed with patient at time of visit     My impression: neg for 1st CMC OA      Procedure Note:  Pt verbally consented regarding risks including hypopigmentation. R radial wrist was sterilely prepped in usual fashion. 0.5 cc of 40mg/mL kenalog and 0.5 cc of 1% lidocaine was injected adjacent to radial styloid along 1st extensor compartment without complication. Pt tolerated procedure well.       Hand / Upper Extremity Injection/Arthrocentesis: R extensor compartment 1    Date/Time: 10/5/2024 9:25 AM    Performed by: Jose Ramon Thao MD  Authorized by: Jose Ramon Thao MD    Indications:  Pain  Needle Size:  25 G  Guidance: landmark    Condition: de Quervain's      Site:  R extensor compartment 1  Medications:  20 mg triamcinolone 40 MG/ML; 0.5 mL lidocaine (PF) 1 %  Outcome:  Tolerated well, no immediate complications  Procedure discussed: discussed risks, benefits, and alternatives    Consent Given by:  Patient  Timeout: timeout called immediately prior to procedure    Prep: patient was prepped and draped in usual sterile fashion            Again, thank you for allowing me to participate in the care of your patient.      Sincerely,    Jose Ramon Thao MD

## 2024-10-05 NOTE — NURSING NOTE
[address] [address]  Dept: [phone]  ______________________________________________________________________________    Patient: Valentin Butts   : 1970   MRN: [id number]   2024    INVASIVE PROCEDURE SAFETY CHECKLIST    Date: 2024   Procedure:BASIL Rueda's CSI   Patient Name: Valentin Butts  MRN: [id number]  YOB: 1970    Action: Complete sections as appropriate. Any discrepancy results in a HARD COPY until resolved.     PRE PROCEDURE:  Patient ID verified with 2 identifiers (name and  or MRN): Yes  Procedure and site verified with patient/designee (when able): Yes  Accurate consent documentation in medical record: Yes  H&P (or appropriate assessment) documented in medical record: Yes  H&P must be up to 20 days prior to procedure and updates within 24 hours of procedure as applicable: Yes  Relevant diagnostic and radiology test results appropriately labeled and displayed as applicable: Yes  Procedure site(s) marked with provider initials: NA    TIMEOUT:  Time-Out performed immediately prior to starting procedure, including verbal and active participation of all team members addressing the following:Yes  * Correct patient identify  * Confirmed that the correct side and site are marked  * An accurate procedure consent form  * Agreement on the procedure to be done  * Correct patient position  * Relevant images and results are properly labeled and appropriately displayed  * The need to administer antibiotics or fluids for irrigation purposes during the procedure as applicable   * Safety precautions based on patient history or medication use    DURING PROCEDURE: Verification of correct person, site, and procedures any time the responsibility for care of the patient is transferred to another member of the care team.       Prior to injection, verified patient identity using patient's name and date of  birth.  Due to injection administration, patient instructed to remain in clinic for 15 minutes  afterwards, and to report any adverse reaction to me immediately.    Tendon sheath injection was performed.     Drug Amount Wasted:  Yes: 4.5 mg/ml   Vial/Syringe: Single dose vial  Expiration Date:  5/1/26 4/1/28      Lydia Winter ATC  October 5, 2024

## 2024-11-25 ENCOUNTER — LAB REQUISITION (OUTPATIENT)
Dept: LAB | Facility: CLINIC | Age: 54
End: 2024-11-25
Payer: COMMERCIAL

## 2024-11-25 DIAGNOSIS — R07.89 OTHER CHEST PAIN: ICD-10-CM

## 2024-11-25 DIAGNOSIS — E03.8 OTHER SPECIFIED HYPOTHYROIDISM: ICD-10-CM

## 2024-11-25 LAB
ALBUMIN SERPL BCG-MCNC: 4.5 G/DL (ref 3.5–5.2)
ALP SERPL-CCNC: 119 U/L (ref 40–150)
ALT SERPL W P-5'-P-CCNC: 20 U/L (ref 0–50)
ANION GAP SERPL CALCULATED.3IONS-SCNC: 13 MMOL/L (ref 7–15)
AST SERPL W P-5'-P-CCNC: 22 U/L (ref 0–45)
BILIRUB SERPL-MCNC: 0.3 MG/DL
BUN SERPL-MCNC: 13.1 MG/DL (ref 6–20)
CALCIUM SERPL-MCNC: 10 MG/DL (ref 8.8–10.4)
CHLORIDE SERPL-SCNC: 103 MMOL/L (ref 98–107)
CREAT SERPL-MCNC: 0.62 MG/DL (ref 0.51–0.95)
EGFRCR SERPLBLD CKD-EPI 2021: >90 ML/MIN/1.73M2
GLUCOSE SERPL-MCNC: 98 MG/DL (ref 70–99)
HCO3 SERPL-SCNC: 25 MMOL/L (ref 22–29)
POTASSIUM SERPL-SCNC: 3.8 MMOL/L (ref 3.4–5.3)
PROT SERPL-MCNC: 7.7 G/DL (ref 6.4–8.3)
SODIUM SERPL-SCNC: 141 MMOL/L (ref 135–145)
TSH SERPL DL<=0.005 MIU/L-ACNC: 0.37 UIU/ML (ref 0.3–4.2)

## 2024-11-25 PROCEDURE — 84443 ASSAY THYROID STIM HORMONE: CPT | Mod: ORL | Performed by: INTERNAL MEDICINE

## 2024-11-25 PROCEDURE — 87338 HPYLORI STOOL AG IA: CPT | Mod: ORL | Performed by: INTERNAL MEDICINE

## 2024-11-25 PROCEDURE — 80053 COMPREHEN METABOLIC PANEL: CPT | Mod: ORL | Performed by: INTERNAL MEDICINE

## 2024-11-26 LAB — H PYLORI AG STL QL IA: NEGATIVE

## 2025-02-25 ENCOUNTER — LAB REQUISITION (OUTPATIENT)
Dept: LAB | Facility: CLINIC | Age: 55
End: 2025-02-25
Payer: COMMERCIAL

## 2025-02-25 DIAGNOSIS — E03.8 OTHER SPECIFIED HYPOTHYROIDISM: ICD-10-CM

## 2025-02-25 DIAGNOSIS — L29.9 PRURITUS, UNSPECIFIED: ICD-10-CM

## 2025-02-25 PROCEDURE — 84443 ASSAY THYROID STIM HORMONE: CPT | Mod: ORL | Performed by: INTERNAL MEDICINE

## 2025-02-25 PROCEDURE — 80053 COMPREHEN METABOLIC PANEL: CPT | Mod: ORL | Performed by: INTERNAL MEDICINE

## 2025-02-26 LAB
ALBUMIN SERPL BCG-MCNC: 4.7 G/DL (ref 3.5–5.2)
ALP SERPL-CCNC: 111 U/L (ref 40–150)
ALT SERPL W P-5'-P-CCNC: 53 U/L (ref 0–50)
ANION GAP SERPL CALCULATED.3IONS-SCNC: 13 MMOL/L (ref 7–15)
AST SERPL W P-5'-P-CCNC: 33 U/L (ref 0–45)
BILIRUB SERPL-MCNC: 0.2 MG/DL
BUN SERPL-MCNC: 11.8 MG/DL (ref 6–20)
CALCIUM SERPL-MCNC: 10.1 MG/DL (ref 8.8–10.4)
CHLORIDE SERPL-SCNC: 104 MMOL/L (ref 98–107)
CREAT SERPL-MCNC: 0.66 MG/DL (ref 0.51–0.95)
EGFRCR SERPLBLD CKD-EPI 2021: >90 ML/MIN/1.73M2
GLUCOSE SERPL-MCNC: 95 MG/DL (ref 70–99)
HCO3 SERPL-SCNC: 27 MMOL/L (ref 22–29)
POTASSIUM SERPL-SCNC: 4.2 MMOL/L (ref 3.4–5.3)
PROT SERPL-MCNC: 8 G/DL (ref 6.4–8.3)
SODIUM SERPL-SCNC: 144 MMOL/L (ref 135–145)
TSH SERPL DL<=0.005 MIU/L-ACNC: 2.68 UIU/ML (ref 0.3–4.2)

## 2025-04-07 ENCOUNTER — ANCILLARY ORDERS (OUTPATIENT)
Dept: MAMMOGRAPHY | Facility: CLINIC | Age: 55
End: 2025-04-07
Payer: COMMERCIAL

## 2025-04-07 ENCOUNTER — MEDICAL CORRESPONDENCE (OUTPATIENT)
Dept: HEALTH INFORMATION MANAGEMENT | Facility: CLINIC | Age: 55
End: 2025-04-07
Payer: COMMERCIAL

## 2025-04-07 DIAGNOSIS — Z12.31 VISIT FOR SCREENING MAMMOGRAM: Primary | ICD-10-CM

## 2025-04-10 ENCOUNTER — TRANSCRIBE ORDERS (OUTPATIENT)
Dept: OTHER | Age: 55
End: 2025-04-10

## 2025-04-10 DIAGNOSIS — R10.13 EPIGASTRIC PAIN: Primary | ICD-10-CM

## 2025-04-13 ENCOUNTER — HEALTH MAINTENANCE LETTER (OUTPATIENT)
Age: 55
End: 2025-04-13

## 2025-04-16 ENCOUNTER — HOSPITAL ENCOUNTER (OUTPATIENT)
Facility: CLINIC | Age: 55
Discharge: HOME | End: 2025-04-16
Attending: INTERNAL MEDICINE | Admitting: INTERNAL MEDICINE
Payer: COMMERCIAL

## 2025-04-16 VITALS
SYSTOLIC BLOOD PRESSURE: 118 MMHG | OXYGEN SATURATION: 100 % | HEART RATE: 78 BPM | DIASTOLIC BLOOD PRESSURE: 79 MMHG | RESPIRATION RATE: 14 BRPM

## 2025-04-16 LAB — UPPER GI ENDOSCOPY: NORMAL

## 2025-04-16 PROCEDURE — 88342 IMHCHEM/IMCYTCHM 1ST ANTB: CPT | Mod: 26 | Performed by: STUDENT IN AN ORGANIZED HEALTH CARE EDUCATION/TRAINING PROGRAM

## 2025-04-16 PROCEDURE — 250N000009 HC RX 250: Performed by: INTERNAL MEDICINE

## 2025-04-16 PROCEDURE — G0500 MOD SEDAT ENDO SERVICE >5YRS: HCPCS | Performed by: INTERNAL MEDICINE

## 2025-04-16 PROCEDURE — 250N000011 HC RX IP 250 OP 636: Performed by: INTERNAL MEDICINE

## 2025-04-16 PROCEDURE — 43239 EGD BIOPSY SINGLE/MULTIPLE: CPT | Performed by: INTERNAL MEDICINE

## 2025-04-16 PROCEDURE — 88341 IMHCHEM/IMCYTCHM EA ADD ANTB: CPT | Mod: 26 | Performed by: STUDENT IN AN ORGANIZED HEALTH CARE EDUCATION/TRAINING PROGRAM

## 2025-04-16 PROCEDURE — 88341 IMHCHEM/IMCYTCHM EA ADD ANTB: CPT | Mod: TC | Performed by: INTERNAL MEDICINE

## 2025-04-16 PROCEDURE — 88305 TISSUE EXAM BY PATHOLOGIST: CPT | Mod: 26 | Performed by: STUDENT IN AN ORGANIZED HEALTH CARE EDUCATION/TRAINING PROGRAM

## 2025-04-16 RX ORDER — FENTANYL CITRATE 50 UG/ML
INJECTION, SOLUTION INTRAMUSCULAR; INTRAVENOUS PRN
Status: DISCONTINUED | OUTPATIENT
Start: 2025-04-16 | End: 2025-04-16 | Stop reason: HOSPADM

## 2025-04-16 RX ORDER — DIPHENHYDRAMINE HYDROCHLORIDE 50 MG/ML
INJECTION, SOLUTION INTRAMUSCULAR; INTRAVENOUS PRN
Status: DISCONTINUED | OUTPATIENT
Start: 2025-04-16 | End: 2025-04-16 | Stop reason: HOSPADM

## 2025-04-16 ASSESSMENT — ACTIVITIES OF DAILY LIVING (ADL)
ADLS_ACUITY_SCORE: 41

## 2025-04-18 ENCOUNTER — ANCILLARY PROCEDURE (OUTPATIENT)
Dept: MAMMOGRAPHY | Facility: CLINIC | Age: 55
End: 2025-04-18
Attending: FAMILY MEDICINE
Payer: COMMERCIAL

## 2025-04-18 DIAGNOSIS — Z12.31 VISIT FOR SCREENING MAMMOGRAM: ICD-10-CM

## 2025-04-18 LAB
PATH REPORT.COMMENTS IMP SPEC: NORMAL
PATH REPORT.FINAL DX SPEC: NORMAL
PATH REPORT.GROSS SPEC: NORMAL
PATH REPORT.MICROSCOPIC SPEC OTHER STN: NORMAL
PATH REPORT.RELEVANT HX SPEC: NORMAL
PHOTO IMAGE: NORMAL

## 2025-04-18 PROCEDURE — 77067 SCR MAMMO BI INCL CAD: CPT | Performed by: RADIOLOGY

## 2025-04-18 PROCEDURE — 77063 BREAST TOMOSYNTHESIS BI: CPT | Performed by: RADIOLOGY

## 2025-04-22 NOTE — RESULT ENCOUNTER NOTE
Ms. Butts:    At the time of your recent upper GI endoscopy, we took biopsies of your stomach.  These biopsies show a condition called atrophic gastritis, which generally does not cause symptoms.    It appears that your primary care physician, Dr. Pollack, has ordered a B12 level as well as other blood tests.    For most patients, atrophic gastritis does not cause clinical difficulties.  However, there is a increased risk of stomach cancer over the long run.  Your endoscopy did not show evidence of malignancy.  It would be reasonable to repeat an upper GI endoscopy in 3 to 5 years (I suggest you talk to your primary care physician at the time that this).    If you have any questions, please contact the Gastroenterology nurse at [phone].     - Dr. Dangelo

## 2025-04-28 ENCOUNTER — LAB REQUISITION (OUTPATIENT)
Dept: LAB | Facility: CLINIC | Age: 55
End: 2025-04-28
Payer: COMMERCIAL

## 2025-04-28 DIAGNOSIS — Z11.59 ENCOUNTER FOR SCREENING FOR OTHER VIRAL DISEASES: ICD-10-CM

## 2025-04-28 DIAGNOSIS — R73.03 PREDIABETES: ICD-10-CM

## 2025-04-28 DIAGNOSIS — K29.40 CHRONIC ATROPHIC GASTRITIS WITHOUT BLEEDING: ICD-10-CM

## 2025-04-28 DIAGNOSIS — I10 ESSENTIAL (PRIMARY) HYPERTENSION: ICD-10-CM

## 2025-04-28 DIAGNOSIS — Z13.220 ENCOUNTER FOR SCREENING FOR LIPOID DISORDERS: ICD-10-CM

## 2025-04-28 LAB
ALBUMIN SERPL BCG-MCNC: 4.8 G/DL (ref 3.5–5.2)
ALP SERPL-CCNC: 128 U/L (ref 40–150)
ALT SERPL W P-5'-P-CCNC: 24 U/L (ref 0–50)
AST SERPL W P-5'-P-CCNC: 23 U/L (ref 0–45)
BILIRUB DIRECT SERPL-MCNC: 0.14 MG/DL (ref 0–0.3)
BILIRUB SERPL-MCNC: 0.3 MG/DL
CHOLEST SERPL-MCNC: 238 MG/DL
EST. AVERAGE GLUCOSE BLD GHB EST-MCNC: 128 MG/DL
FASTING STATUS PATIENT QL REPORTED: YES
FERRITIN SERPL-MCNC: 65 NG/ML (ref 11–328)
HBA1C MFR BLD: 6.1 %
HBV SURFACE AB SERPL IA-ACNC: <3.5 M[IU]/ML
HBV SURFACE AB SERPL IA-ACNC: NONREACTIVE M[IU]/ML
HBV SURFACE AG SERPL QL IA: NONREACTIVE
HCV AB SERPL QL IA: NONREACTIVE
HDLC SERPL-MCNC: 67 MG/DL
IRON BINDING CAPACITY (ROCHE): 412 UG/DL (ref 240–430)
IRON SATN MFR SERPL: 25 % (ref 15–46)
IRON SERPL-MCNC: 102 UG/DL (ref 37–145)
LDLC SERPL CALC-MCNC: 154 MG/DL
NONHDLC SERPL-MCNC: 171 MG/DL
PROT SERPL-MCNC: 8 G/DL (ref 6.4–8.3)
TRIGL SERPL-MCNC: 84 MG/DL
VIT B12 SERPL-MCNC: 404 PG/ML (ref 232–1245)

## 2025-04-28 PROCEDURE — 86706 HEP B SURFACE ANTIBODY: CPT | Mod: ORL | Performed by: INTERNAL MEDICINE

## 2025-04-28 PROCEDURE — 83540 ASSAY OF IRON: CPT | Mod: ORL | Performed by: INTERNAL MEDICINE

## 2025-04-28 PROCEDURE — 86803 HEPATITIS C AB TEST: CPT | Mod: ORL | Performed by: INTERNAL MEDICINE

## 2025-04-28 PROCEDURE — 80076 HEPATIC FUNCTION PANEL: CPT | Mod: ORL | Performed by: INTERNAL MEDICINE

## 2025-04-28 PROCEDURE — 82728 ASSAY OF FERRITIN: CPT | Mod: ORL | Performed by: INTERNAL MEDICINE

## 2025-04-28 PROCEDURE — 87340 HEPATITIS B SURFACE AG IA: CPT | Mod: ORL | Performed by: INTERNAL MEDICINE

## 2025-04-28 PROCEDURE — 83516 IMMUNOASSAY NONANTIBODY: CPT | Mod: ORL | Performed by: INTERNAL MEDICINE

## 2025-04-28 PROCEDURE — 86340 INTRINSIC FACTOR ANTIBODY: CPT | Mod: ORL | Performed by: INTERNAL MEDICINE

## 2025-04-28 PROCEDURE — 80061 LIPID PANEL: CPT | Mod: ORL | Performed by: INTERNAL MEDICINE

## 2025-04-28 PROCEDURE — 82607 VITAMIN B-12: CPT | Mod: ORL | Performed by: INTERNAL MEDICINE

## 2025-04-28 PROCEDURE — 83036 HEMOGLOBIN GLYCOSYLATED A1C: CPT | Mod: ORL | Performed by: INTERNAL MEDICINE

## 2025-04-30 LAB — IF BLOCK AB SER QL RIA: NEGATIVE

## 2025-05-01 LAB — PCA IGG SER-ACNC: 89.9 UNITS

## 2025-06-17 ENCOUNTER — OFFICE VISIT (OUTPATIENT)
Dept: ORTHOPEDICS | Facility: CLINIC | Age: 55
End: 2025-06-17
Payer: MEDICAID

## 2025-06-17 DIAGNOSIS — M65.4 DE QUERVAIN'S TENOSYNOVITIS, RIGHT: Primary | ICD-10-CM

## 2025-06-17 RX ORDER — TRIAMCINOLONE ACETONIDE 40 MG/ML
20 INJECTION, SUSPENSION INTRA-ARTICULAR; INTRAMUSCULAR
Status: COMPLETED | OUTPATIENT
Start: 2025-06-17 | End: 2025-06-17

## 2025-06-17 RX ORDER — LIDOCAINE HYDROCHLORIDE 10 MG/ML
0.5 INJECTION, SOLUTION EPIDURAL; INFILTRATION; INTRACAUDAL; PERINEURAL
Status: COMPLETED | OUTPATIENT
Start: 2025-06-17 | End: 2025-06-17

## 2025-06-17 RX ADMIN — LIDOCAINE HYDROCHLORIDE 0.5 ML: 10 INJECTION, SOLUTION EPIDURAL; INFILTRATION; INTRACAUDAL; PERINEURAL at 12:25

## 2025-06-17 RX ADMIN — TRIAMCINOLONE ACETONIDE 20 MG: 40 INJECTION, SUSPENSION INTRA-ARTICULAR; INTRAMUSCULAR at 12:25

## 2025-06-17 SDOH — HEALTH STABILITY: PHYSICAL HEALTH: ON AVERAGE, HOW MANY DAYS PER WEEK DO YOU ENGAGE IN MODERATE TO STRENUOUS EXERCISE (LIKE A BRISK WALK)?: 3 DAYS

## 2025-06-17 NOTE — PROGRESS NOTES
Hand / Upper Extremity Injection: R extensor compartment 1    Date/Time: 6/17/2025 12:25 PM    Performed by: Jose Ramon Thao MD  Authorized by: Jose Ramon Thao MD    Indications:  Diagnostic and tendon swelling  Needle Size:  25 G  Guidance: landmark    Approach:  Radial  Condition: de Quervain's      Site:  R extensor compartment 1  Medications:  20 mg triamcinolone 40 MG/ML; 0.5 mL lidocaine (PF) 1 %  Outcome:  Tolerated well, no immediate complications  Procedure discussed: discussed risks, benefits, and alternatives    Consent Given by:  Patient  Timeout: timeout called immediately prior to procedure    Prep: patient was prepped and draped in usual sterile fashion       Ruslan Kahn M.A., LAT, ATC  Certified Athletic Trainer

## 2025-06-17 NOTE — LETTER
6/17/2025      RE: Valentin Butts  [address]  [facility] [address]     Dear Colleague,    Thank you for referring your patient, Valentin Butts, to the [facility] [place]. Please see a copy of my visit note below.    ASSESSMENT/PLAN:    (M65.4) De Quervain's tenosynovitis, right  (primary encounter diagnosis)  Comment: repeat dequervain's injection today; will start hand therapy; precautions/ anticipatory guidance given' f/up prn  Plan: Hand Therapy Referral, Hand / Upper Extremity         Injection: R extensor compartment 1,         triamcinolone (KENALOG-40) 40 MG/ML injection,         lidocaine (PF) (XYLOCAINE) 1 % injection          Jose Ramon Thao MD  June 17, 2025  12:31 PM      Pt is a 55 year old female last seen on 10/5/24 here for follow up of:     Right wrist De Quervain's: has returned recently over last month  No new injury  Grandson is now 1.5 yrs old        Per my last note:  (M65.4) De Quervain's tenosynovitis, right  (primary encounter diagnosis)  Comment: exam, imaging consistent w/ DeQuervain's; we discussed tx options and she was amenable to an injection; referred to hand therapy for modalities/ custom splint; precautions/ anticipatory guidance given  Plan: Hand / Upper Extremity Injection/Arthrocentesis: R extensor compartment 1, Hand Therapy Referral, diclofenac (VOLTAREN) 1 % topical gel            (M25.531) Right wrist pain  Comment: see above  Plan: XR Wrist Right G/E 3 Views    No past medical history on file.   Current Outpatient Medications   Medication Sig Dispense Refill     diclofenac (VOLTAREN) 1 % topical gel Apply 2 g topically 4 times daily. For R wrist pain 100 g 2     ranitidine (ZANTAC) 150 MG tablet Take 1 tablet (150 mg) by mouth At Bedtime 90 tablet 1     SYNTHROID 88 MCG tablet   1      No Known Allergies   ROS:   Gen- no fevers/chills   Rheum - no morning stiffness   Derm - no rash/ redness   Neuro - no numbness, no tingling    Remainder of ROS negative.     Exam:     R wrist:  +TTP at radial styloid  +Finkelstein      Procedure Note:  Pt verbally consented regarding risks including hypopigmentation. R radial wrist was sterilely prepped in usual fashion. 1 cc of 40mg/mL kenalog and 1 cc of 1% lidocaine was injected adjacent to radial styloid along 1st extensor compartment without complication. Pt tolerated procedure well.       Hand / Upper Extremity Injection: R extensor compartment 1    Date/Time: 6/17/2025 12:25 PM    Performed by: Jose Ramon Thao MD  Authorized by: Jose Ramon Thao MD    Indications:  Diagnostic and tendon swelling  Needle Size:  25 G  Guidance: landmark    Approach:  Radial  Condition: de Quervain's      Site:  R extensor compartment 1  Medications:  20 mg triamcinolone 40 MG/ML; 0.5 mL lidocaine (PF) 1 %  Outcome:  Tolerated well, no immediate complications  Procedure discussed: discussed risks, benefits, and alternatives    Consent Given by:  Patient  Timeout: timeout called immediately prior to procedure    Prep: patient was prepped and draped in usual sterile fashion       Ruslan Kahn M.A., LAT, ATC  Certified Athletic Trainer            Again, thank you for allowing me to participate in the care of your patient.      Sincerely,    Jose Ramon Thao MD

## 2025-06-17 NOTE — PROGRESS NOTES
ASSESSMENT/PLAN:    (M65.4) De Quervain's tenosynovitis, right  (primary encounter diagnosis)  Comment: repeat dequervain's injection today; will start hand therapy; precautions/ anticipatory guidance given' f/up prn  Plan: Hand Therapy Referral, Hand / Upper Extremity         Injection: R extensor compartment 1,         triamcinolone (KENALOG-40) 40 MG/ML injection,         lidocaine (PF) (XYLOCAINE) 1 % injection          Jose Ramon Thao MD  June 17, 2025  12:31 PM      Pt is a 55 year old female last seen on 10/5/24 here for follow up of:     Right wrist De Quervain's: has returned recently over last month  No new injury  Grandson is now 1.5 yrs old        Per my last note:  (M65.4) De Quervain's tenosynovitis, right  (primary encounter diagnosis)  Comment: exam, imaging consistent w/ DeQuervain's; we discussed tx options and she was amenable to an injection; referred to hand therapy for modalities/ custom splint; precautions/ anticipatory guidance given  Plan: Hand / Upper Extremity Injection/Arthrocentesis: R extensor compartment 1, Hand Therapy Referral, diclofenac (VOLTAREN) 1 % topical gel            (M25.531) Right wrist pain  Comment: see above  Plan: XR Wrist Right G/E 3 Views    No past medical history on file.   Current Outpatient Medications   Medication Sig Dispense Refill    diclofenac (VOLTAREN) 1 % topical gel Apply 2 g topically 4 times daily. For R wrist pain 100 g 2    ranitidine (ZANTAC) 150 MG tablet Take 1 tablet (150 mg) by mouth At Bedtime 90 tablet 1    SYNTHROID 88 MCG tablet   1      No Known Allergies   ROS:   Gen- no fevers/chills   Rheum - no morning stiffness   Derm - no rash/ redness   Neuro - no numbness, no tingling   Remainder of ROS negative.     Exam:     R wrist:  +TTP at radial styloid  +Finkelstein      Procedure Note:  Pt verbally consented regarding risks including hypopigmentation. R radial wrist was sterilely prepped in usual fashion. 1 cc of 40mg/mL kenalog and 1 cc of 1%  lidocaine was injected adjacent to radial styloid along 1st extensor compartment without complication. Pt tolerated procedure well.

## 2025-06-17 NOTE — NURSING NOTE
[address] [address]  Dept: [phone]  ______________________________________________________________________________    Patient: Valentin Butts   : 1970   MRN: [id number]   2025    INVASIVE PROCEDURE SAFETY CHECKLIST    Date: 25   Procedure: Right De Quervains CSI  Patient Name: Valentin Butts  MRN: [id number]  YOB: 1970    Action: Complete sections as appropriate. Any discrepancy results in a HARD COPY until resolved.     PRE PROCEDURE:  Patient ID verified with 2 identifiers (name and  or MRN): Yes  Procedure and site verified with patient/designee (when able): Yes  Accurate consent documentation in medical record: Yes  H&P (or appropriate assessment) documented in medical record: Yes  H&P must be up to 20 days prior to procedure and updates within 24 hours of procedure as applicable: NA  Relevant diagnostic and radiology test results appropriately labeled and displayed as applicable: Yes  Procedure site(s) marked with provider initials: NA    TIMEOUT:  Time-Out performed immediately prior to starting procedure, including verbal and active participation of all team members addressing the following:Yes  * Correct patient identify  * Confirmed that the correct side and site are marked  * An accurate procedure consent form  * Agreement on the procedure to be done  * Correct patient position  * Relevant images and results are properly labeled and appropriately displayed  * The need to administer antibiotics or fluids for irrigation purposes during the procedure as applicable   * Safety precautions based on patient history or medication use    DURING PROCEDURE: Verification of correct person, site, and procedures any time the responsibility for care of the patient is transferred to another member of the care team.       Prior to injection, verified patient identity using patient's name and date of birth.  Due to  injection administration, patient instructed to remain in clinic for 15 minutes  afterwards, and to report any adverse reaction to me immediately.    Tendon sheath injection was performed.     Kenalog Amount Wasted:  Yes: 0.5 mg/ml   Vial/Syringe: Single dose vial  Expiration Date:  11/1/26    Lidocaine Amount Wasted:  Yes: 4.5 mg/ml   Vial/Syringe: Multi dose vial  Expiration Date: 1/1/29    Ruslan Kahn, ATC  June 17, 2025
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
## 2025-07-14 ENCOUNTER — THERAPY VISIT (OUTPATIENT)
Dept: OCCUPATIONAL THERAPY | Facility: CLINIC | Age: 55
End: 2025-07-14
Attending: FAMILY MEDICINE
Payer: COMMERCIAL

## 2025-07-14 DIAGNOSIS — M65.4 DE QUERVAIN'S TENOSYNOVITIS, RIGHT: ICD-10-CM

## 2025-07-14 PROCEDURE — 97760 ORTHOTIC MGMT&TRAING 1ST ENC: CPT | Mod: GO | Performed by: SPECIALIST/TECHNOLOGIST

## 2025-07-14 PROCEDURE — 97165 OT EVAL LOW COMPLEX 30 MIN: CPT | Mod: GO | Performed by: SPECIALIST/TECHNOLOGIST

## 2025-07-14 PROCEDURE — 97535 SELF CARE MNGMENT TRAINING: CPT | Mod: GO | Performed by: SPECIALIST/TECHNOLOGIST

## (undated) RX ORDER — DIPHENHYDRAMINE HYDROCHLORIDE 50 MG/ML
INJECTION, SOLUTION INTRAMUSCULAR; INTRAVENOUS
Status: DISPENSED
Start: 2025-04-16

## (undated) RX ORDER — LIDOCAINE HYDROCHLORIDE 10 MG/ML
INJECTION, SOLUTION EPIDURAL; INFILTRATION; INTRACAUDAL; PERINEURAL
Status: DISPENSED
Start: 2024-10-05

## (undated) RX ORDER — TRIAMCINOLONE ACETONIDE 40 MG/ML
INJECTION, SUSPENSION INTRA-ARTICULAR; INTRAMUSCULAR
Status: DISPENSED
Start: 2024-10-05

## (undated) RX ORDER — LIDOCAINE HYDROCHLORIDE 10 MG/ML
INJECTION, SOLUTION EPIDURAL; INFILTRATION; INTRACAUDAL; PERINEURAL
Status: DISPENSED
Start: 2025-06-17

## (undated) RX ORDER — TRIAMCINOLONE ACETONIDE 40 MG/ML
INJECTION, SUSPENSION INTRA-ARTICULAR; INTRAMUSCULAR
Status: DISPENSED
Start: 2025-06-17

## (undated) RX ORDER — FENTANYL CITRATE 50 UG/ML
INJECTION, SOLUTION INTRAMUSCULAR; INTRAVENOUS
Status: DISPENSED
Start: 2025-04-16